# Patient Record
Sex: FEMALE | Race: WHITE
[De-identification: names, ages, dates, MRNs, and addresses within clinical notes are randomized per-mention and may not be internally consistent; named-entity substitution may affect disease eponyms.]

---

## 2017-01-26 ENCOUNTER — HOSPITAL ENCOUNTER (EMERGENCY)
Dept: HOSPITAL 36 - ER | Age: 47
Discharge: HOME | End: 2017-01-26
Payer: MEDICAID
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
## 2017-01-26 DIAGNOSIS — S62.636A: Primary | ICD-10-CM
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
## 2017-01-26 PROCEDURE — Z7502: HCPCS

## 2017-01-26 NOTE — ED PHYSICIAN CHART
Chief Complaint/HPI





- Patient Information


Date Seen:: 01/26/17


Time Seen:: 10:00


Chief Complaint:: trauma right small finger


History of Present Illness:: 





11 days ago getting up from supine position pushed herself up injuring right 

small finger.  Is right hand dominant.


Allergies:: 


 Allergies











Allergy/AdvReac Type Severity Reaction Status Date / Time


 


No Known Allergies Allergy   Verified 01/26/17 10:06











Vitals:: 


 Vital Signs - 8 hr











  01/26/17





  10:06


 


Temp 98.5 F


 


HR 78


 


RR 18


 


/74


 


O2 Sat % 97











Historian:: Patient


Review:: Nurse's Note Reviewed





Review of Systems





- Review of Systems


General/Constitutional: No fever


Skin: No skin lesions


Eyes: No loss of vision


ENT: No earache


Neck: No neck pain


Cardio Vascular: No chest pain


Pulmonary: No SOB


GI: No nausea, No vomiting


Musculoskeletal: Bone or joint pain


Psychiatric: No prior psych history, No depression


Hematopoietic: No bruising


Allergic/Immuno: No urticaria


Neurological: No syncope





Past Medical History





- Past Medical History


Past Medical History: No significant medical hx


Family History: Diabetes Melitus


Social History: Non Smoker


Surgical History: Hysterectomy


Psychiatricy History: None


Medication: None





Family Medical History





- Family Member


  ** Father


Ethnicity: 


Hx Family Diabetes: Yes





Physical Exam





- Physical Examination


General/Constitutional: Well-developed, well-nourished


Head: Atraumatic


Eyes: Lids, conjuctiva normal, PERRL


Skin: Well hydrated


ENMT: External ears, nose nl


Neck: No nuchal rigidity


Respiratory: Nl effort/Exclusion, Clear to Auscultation


Cardio Vascular: RRR, No murmur, gallop, rubs


GI: No tenderness/rebounding/guarding, No organomegaly, No hernia, Normal BS's


: No CVA tenderness


Other Extremities comments:: 





right small finger: about 15 degrees flexion of DIP joint; redness and swelling 

of distal segment; tenderness of DIP joint 





Labs/Radiology/EKG Results





- Radiology Results


Results: 


fracture base of distal phalanx right small finger





Assessment





- Assessment


General Assessment: 





redness of distal segment may be due to cellulitis; fracture or tendon rupture 

is unlikely to cause redness





ED Septic Shock





- .


Is Septic Shock (SBP<90, OR Lactate>4 mmol\L) present?: No





- <6hrs of presentation:


Vital Signs: 


 Vital Signs - 8 hr











  01/26/17





  10:06


 


Temp 98.5 F


 


HR 78


 


RR 18


 


/74


 


O2 Sat % 97














Reassessment (Disposition)





- Reassessment


Reassessment:: 


stak splint applied right small finger


Reassessment Condition:: Unchanged





- Diagnosis


Diagnosis:: 





tendon rupture DIP joint right small finger; fracture base of distal phalanx 

right small finger; cellulitis distal segment right small finger





- Aftercare/Follow up Instructions


Aftercare/Follow-Up Instructions:: Refer to Discharge Instructions


Medication Prescribed:: 


Keflex 500 mg #40 Sig 1 QID





- Patient Disposition


Discharge/Transfer:: Home


Condition at Disposition:: Stable, Unchanged

## 2017-01-26 NOTE — DIAGNOSTIC IMAGING REPORT
Right fifth finger 3 views



Indication: Trauma



Comparison: none



Findings: There is a mildly displaced fracture involving the dorsal base

of the fifth distal phalanx with surrounding soft tissue swelling. No

dislocation.



Impression:



Mildly displaced fracture involving the dorsal base of the fifth distal

phalanx with surrounding soft tissue swelling. Please correlate

clinically.



Results were administered to the referring team on 1/26/2017 at 11:30

AM.

## 2018-07-24 ENCOUNTER — HOSPITAL ENCOUNTER (INPATIENT)
Dept: HOSPITAL 36 - ER | Age: 48
LOS: 4 days | Discharge: HOME | DRG: 233 | End: 2018-07-28
Attending: FAMILY MEDICINE | Admitting: FAMILY MEDICINE
Payer: MEDICAID

## 2018-07-24 DIAGNOSIS — Z90.710: ICD-10-CM

## 2018-07-24 DIAGNOSIS — K76.0: ICD-10-CM

## 2018-07-24 DIAGNOSIS — Z82.49: ICD-10-CM

## 2018-07-24 DIAGNOSIS — K52.9: ICD-10-CM

## 2018-07-24 DIAGNOSIS — E78.5: ICD-10-CM

## 2018-07-24 DIAGNOSIS — E87.1: ICD-10-CM

## 2018-07-24 DIAGNOSIS — Z86.73: ICD-10-CM

## 2018-07-24 DIAGNOSIS — N20.0: ICD-10-CM

## 2018-07-24 DIAGNOSIS — K35.2: Primary | ICD-10-CM

## 2018-07-24 DIAGNOSIS — N39.0: ICD-10-CM

## 2018-07-24 DIAGNOSIS — E66.8: ICD-10-CM

## 2018-07-24 DIAGNOSIS — E86.0: ICD-10-CM

## 2018-07-24 DIAGNOSIS — E78.00: ICD-10-CM

## 2018-07-24 DIAGNOSIS — I10: ICD-10-CM

## 2018-07-24 DIAGNOSIS — K42.0: ICD-10-CM

## 2018-07-24 DIAGNOSIS — E87.6: ICD-10-CM

## 2018-07-24 LAB
ALBUMIN SERPL-MCNC: 4.2 GM/DL (ref 3.7–5.3)
ALBUMIN/GLOB SERPL: 1.4 {RATIO} (ref 1–1.8)
ALP SERPL-CCNC: 97 U/L (ref 34–104)
ALT SERPL-CCNC: 62 U/L (ref 7–52)
AMYLASE SERPL-CCNC: 13 U/L (ref 29–103)
ANION GAP SERPL CALC-SCNC: 14.1 MMOL/L (ref 7–16)
APPEARANCE UR: (no result)
AST SERPL-CCNC: 55 U/L (ref 13–39)
BACTERIA #/AREA URNS HPF: (no result) /HPF
BASOPHILS # BLD AUTO: 0.3 TH/CUMM (ref 0–0.2)
BASOPHILS NFR BLD AUTO: 3.4 % (ref 0–2)
BILIRUB SERPL-MCNC: 1.1 MG/DL (ref 0.3–1)
BILIRUB UR-MCNC: (no result) MG/DL
BUN SERPL-MCNC: 16 MG/DL (ref 7–25)
CALCIUM SERPL-MCNC: 9.3 MG/DL (ref 8.6–10.3)
CHLORIDE SERPL-SCNC: 100 MEQ/L (ref 98–107)
CHOLEST SERPL-MCNC: 256 MG/DL (ref ?–200)
CK SERPL-CCNC: 27 U/L (ref 30–223)
CO2 SERPL-SCNC: 21.1 MEQ/L (ref 21–31)
COLOR UR: YELLOW
CREAT SERPL-MCNC: 1 MG/DL (ref 0.6–1.2)
EOSINOPHIL # BLD AUTO: 0.1 TH/CMM (ref 0.1–0.4)
EOSINOPHIL NFR BLD AUTO: 0.8 % (ref 0–5)
EPI CELLS URNS QL MICRO: (no result) /LPF
ERYTHROCYTE [DISTWIDTH] IN BLOOD BY AUTOMATED COUNT: 13.1 % (ref 11.5–20)
FINE GRAN CASTS #/AREA URNS LPF: (no result) /LPF
GLOBULIN SER-MCNC: 3.1 GM/DL
GLUCOSE SERPL-MCNC: 137 MG/DL (ref 70–105)
GLUCOSE UR STRIP-MCNC: NEGATIVE MG/DL
GRAN CASTS #/AREA URNS: (no result) /LPF
HCT VFR BLD CALC: 42.9 % (ref 41–60)
HDLC SERPL-MCNC: 64 MG/DL (ref 23–92)
HGB BLD-MCNC: 14.6 GM/DL (ref 12–16)
INR PPP: 0.92 (ref 0.5–1.4)
KETONES UR STRIP-MCNC: (no result) MG/DL
LEUKOCYTE ESTERASE UR-ACNC: (no result)
LIPASE SERPL-CCNC: 11 U/L (ref 11–82)
LYMPHOCYTE AB SER FC-ACNC: 0.6 TH/CMM (ref 1.5–3)
LYMPHOCYTES NFR BLD AUTO: 7.8 % (ref 20–50)
MCH RBC QN AUTO: 31.1 PG (ref 27–31)
MCHC RBC AUTO-ENTMCNC: 34 PG (ref 28–36)
MCV RBC AUTO: 91.4 FL (ref 81–100)
MICRO URNS: YES
MONOCYTES # BLD AUTO: 0.1 TH/CMM (ref 0.3–1)
MONOCYTES NFR BLD AUTO: 0.8 % (ref 2–10)
NEUTROPHILS # BLD: 7.1 TH/CMM (ref 1.8–8)
NEUTROPHILS NFR BLD AUTO: 87.2 % (ref 40–80)
NITRITE UR QL STRIP: NEGATIVE
PH UR STRIP: 6 [PH] (ref 4.6–8)
PLATELET # BLD: 184 TH/CMM (ref 150–400)
PMV BLD AUTO: 8.8 FL
POTASSIUM SERPL-SCNC: 3.2 MEQ/L (ref 3.5–5.1)
PROT UR STRIP-MCNC: (no result) MG/DL
PROTHROMBIN TIME: 9.6 SECONDS (ref 9.5–11.5)
RBC # BLD AUTO: 4.7 MIL/CMM (ref 3.8–5.1)
RBC # UR STRIP: (no result) /UL
RBC #/AREA URNS HPF: (no result) /HPF (ref 0–5)
SODIUM SERPL-SCNC: 132 MEQ/L (ref 136–145)
SP GR UR STRIP: 1.01 (ref 1–1.03)
TRIGL SERPL-MCNC: 144 MG/DL (ref ?–150)
URINALYSIS COMPLETE PNL UR: (no result)
UROBILINOGEN UR STRIP-ACNC: 2 E.U./DL (ref 0.2–1)
WBC # BLD AUTO: 8.2 TH/CMM (ref 4.8–10.8)
WBC #/AREA URNS HPF: (no result) /HPF (ref 0–5)

## 2018-07-24 PROCEDURE — Z7610: HCPCS

## 2018-07-24 PROCEDURE — X6258: HCPCS

## 2018-07-24 PROCEDURE — 0DTJ4ZZ RESECTION OF APPENDIX, PERCUTANEOUS ENDOSCOPIC APPROACH: ICD-10-PCS

## 2018-07-24 RX ADMIN — DEXTROSE, SODIUM CHLORIDE, AND POTASSIUM CHLORIDE SCH MLS/HR: 5; .9; .15 INJECTION INTRAVENOUS at 17:48

## 2018-07-24 RX ADMIN — METRONIDAZOLE SCH MLS/HR: 500 INJECTION, SOLUTION INTRAVENOUS at 22:54

## 2018-07-24 NOTE — CONSULTATION
DATE OF CONSULTATION:  07/24/2018



SURGICAL CONSULTATION



REFERRING PHYSICIAN:  Dr. Pacheco.



REASON FOR CONSULTATION:  Abdominal pain.



Thank you for referring this patient to me.



HISTORY OF PRESENT ILLNESS:  This is a 47-year-old female with 2-day history of

abdominal pain, increasingly severe, associated nausea and vomiting.  She denied

diarrhea.



The patient smokes about 3-4 cigarettes a day.  She denies alcohol intake.



PAST MEDICAL HISTORY:  Includes vaginal hysterectomy and tubal ligation.



Otherwise, she does not take any medication.



LABORATORY STUDIES:  Show CBC to be normal.  The chemistry is abnormal with a

slight elevation in the liver function test and cholesterol is high at 256. 

Lipase low at 13.



A CT scan of the abdomen shows a small fat containing umbilical hernia.  There

is fatty infiltration of the liver.  There is a dilated partially air and fluid

filled appendix with trace fluid in the right lower quadrant.



PHYSICAL EXAMINATION:  There is severe tenderness in the right lower quadrant

with rebound.  Scar from previous surgeries noted.



IMPRESSION:

1.  Acute appendicitis.

2.  Fatty infiltration of the liver.

3.  Hypercholesterolemia.

4.  Moderate obesity.



PLAN:

1.  Laparoscopic versus open appendectomy.

2.  Repair of umbilical hernia.



Informed consent discussed with the patient and the son who was at bedside with

possible complications that might include among other things, bleeding,

infection, last unintended injury to internal organs, open appendectomy, and

those related to anesthesia.  The patient and son understands and will proceed

with a laparoscopic versus open appendectomy and hernia repair.





DD: 07/24/2018 18:19

DT: 07/24/2018 20:10

[facility]# [id number]  [id number]

## 2018-07-24 NOTE — OPERATIVE REPORT
DATE OF SURGERY:  07/24/2018



PREOPERATIVE DIAGNOSES:

1.  Acute appendicitis.

2.  Incarcerated umbilical hernia (omentum).

3.  Post-vaginal hysterectomy.



POSTOPERATIVE DIAGNOSES:

1.  Perforated appendicitis.

2.  Incarcerated umbilical hernia (omentum).

3.  Post-vaginal hysterectomy.



OPERATION DONE:

1.  Laparoscopic appendectomy.

2.  Primary repair of incarcerated umbilical hernia.



SURGEON:  Dr. Bel José.



ANESTHESIA:  General.



ANESTHESIOLOGIST:  Dr. Saenz.



ESTIMATED BLOOD LOSS:  5 mL.



OPERATIVE FINDINGS:  Perforated appendix with gangrenous mid portion of the body

of the appendix, which was markedly swollen.



DRAIN:  A Tre-Hicks drain was left in place.



DESCRIPTION OF PROCEDURE:  The patient was given general anesthesia.  The

abdomen was prepped with ChloraPrep and draped in appropriate manner.



An infraumbilical incision was made along the skin line.  Bleeders were

coagulated.



The incision was carried all the way down to the peritoneum and the finger was

introduced.  There was incarcerated omentum and the umbilicus, which was freed

back into the abdominal cavity.



Old Nurolon was then sutured to the fascia as a 10-mm trocar was introduced. 

CO2 was insufflated.  Scope was introduced.  There was good visualization of the

intra-abdominal cavity.



A 5 mm was placed in the lower abdomen at midline.  A 12-mm trocar was placed in

the left flank.



The operating table was lowered at the head and turned to the left side.  The

appendix was found to be necrotic at the mid portion.  In the process of

manipulation, this exuded purulent material and culture was taken.



The base of the appendix was easily identified and a clip was applied.  The rest

of the mesoappendix serially clamped with [place] and transected.



Following satisfactory hemostasis, some clips were applied.



Irrigation with saline solution and aspiration of fluid was then done.  A

Tre-Hicks drain was left in the area of the appendix.  The appendix removed

in Endobag.



Following satisfactory hemostasis confirmation of the bleeding site, trocar

sites were infiltrated with 0.5% Marcaine with epinephrine.  The skin incision

closed with staples.  The patient tolerated the procedure well.





DD: 07/24/2018 20:41

DT: 07/24/2018 21:25

JOB# [id number]  [id number]

## 2018-07-24 NOTE — GENERAL PROGRESS NOTE
Subjective





- Review of Systems


Service Date: 07/24/18


Events since last encounter: 





CONSULT DICTATED


CT DILATED APPENDIX, FATTY LIVER


PE TENDER RLQ WITH REBOUND


PLAN; LAP VS OPEN APPENDECTOMY WITH HERNIA REPAIR





Objective





- Results


Result Diagrams: 


 07/24/18 11:00





 07/24/18 11:00


Recent Labs: 


 Laboratory Last Values











WBC  8.2 Th/cmm (4.8-10.8)   07/24/18  11:00    


 


RBC  4.70 Mil/cmm (3.80-5.10)   07/24/18  11:00    


 


Hgb  14.6 gm/dL (12-16)   07/24/18  11:00    


 


Hct  42.9 % (41.0-60)   07/24/18  11:00    


 


MCV  91.4 fl ()   07/24/18  11:00    


 


MCH  31.1 pg (27.0-31.0)  H  07/24/18  11:00    


 


MCHC Differential  34.0 pg (28.0-36.0)   07/24/18  11:00    


 


RDW  13.1 % (11.5-20.0)   07/24/18  11:00    


 


Plt Count  184 Th/cmm (150-400)   07/24/18  11:00    


 


MPV  8.8 fl  07/24/18  11:00    


 


Neutrophils %  87.2 % (40.0-80.0)  H  07/24/18  11:00    


 


Lymphocytes %  7.8 % (20.0-50.0)  L  07/24/18  11:00    


 


Monocytes %  0.8 % (2.0-10.0)  L  07/24/18  11:00    


 


Eosinophils %  0.8 % (0.0-5.0)   07/24/18  11:00    


 


Basophils %  3.4 % (0.0-2.0)  H  07/24/18  11:00    


 


Neutrophils (Manual)  Not Reportable   07/24/18  11:00    


 


PT  9.6 SECONDS (9.5-11.5)   07/24/18  11:00    


 


INR  0.92  (0.5-1.4)   07/24/18  11:00    


 


Sodium  132 mEq/L (136-145)  L  07/24/18  11:00    


 


Potassium  3.2 mEq/L (3.5-5.1)  L  07/24/18  11:00    


 


Chloride  100 mEq/L ()   07/24/18  11:00    


 


Carbon Dioxide  21.1 mEq/L (21.0-31.0)   07/24/18  11:00    


 


Anion Gap  14.1  (7.0-16.0)   07/24/18  11:00    


 


BUN  16 mg/dL (7-25)   07/24/18  11:00    


 


Creatinine  1.0 mg/dL (0.6-1.2)   07/24/18  11:00    


 


Est GFR ( Amer)  > 60.0 ml/min (>90)   07/24/18  11:00    


 


Est GFR (Non-Af Amer)  > 60.0 ml/min  07/24/18  11:00    


 


BUN/Creatinine Ratio  16.0   07/24/18  11:00    


 


Glucose  137 mg/dL ()  H  07/24/18  11:00    


 


Calcium  9.3 mg/dL (8.6-10.3)   07/24/18  11:00    


 


Total Bilirubin  1.1 mg/dL (0.3-1.0)  H  07/24/18  11:00    


 


AST  55 U/L (13-39)  H  07/24/18  11:00    


 


ALT  62 U/L (7-52)  H  07/24/18  11:00    


 


Alkaline Phosphatase  97 U/L ()   07/24/18  11:00    


 


Creatine Kinase  27 U/L ()  L  07/24/18  11:00    


 


Troponin I  < 0.01 ng/mL (0.01-0.05)  L  07/24/18  11:00    


 


B-Natriuretic Peptide  10.4 pg/mL (5.0-100.0)   07/24/18  11:00    


 


Total Protein  7.3 gm/dL (6.0-8.3)   07/24/18  11:00    


 


Albumin  4.2 gm/dL (3.7-5.3)   07/24/18  11:00    


 


Globulin  3.1 gm/dL  07/24/18  11:00    


 


Albumin/Globulin Ratio  1.4  (1.0-1.8)   07/24/18  11:00    


 


Triglycerides  144 mg/dL (<150)   07/24/18  11:00    


 


Cholesterol  256 mg/dL (<200)  H  07/24/18  11:00    


 


LDL Cholesterol Direct  173 mg/dL ()   07/24/18  11:00    


 


HDL Cholesterol  64 mg/dL (23-92)   07/24/18  11:00    


 


Amylase  13 U/L ()  L  07/24/18  11:00    


 


Lipase  11 U/L (11-82)   07/24/18  11:00    


 


Serum Pregnancy, Qual  NEGATIVE  (NEGATIVE)   07/24/18  11:00    


 


Urine Source  CLEAN C   07/24/18  13:29    


 


Urine Color  YELLOW   07/24/18  13:29    


 


Urine Clarity  HAZY  (CLEAR)   07/24/18  13:29    


 


Urine pH  6.0  (4.6 - 8.0)   07/24/18  13:29    


 


Ur Specific Gravity  1.010  (1.005-1.030)   07/24/18  13:29    


 


Urine Protein  TRACE mg/dL (NEGATIVE)   07/24/18  13:29    


 


Urine Glucose (UA)  NEGATIVE mg/dL (NEGATIVE)   07/24/18  13:29    


 


Urine Ketones  TRACE mg/dL (NEGATIVE)   07/24/18  13:29    


 


Urine Blood  TRACE  (NEGATIVE)   07/24/18  13:29    


 


Urine Nitrate  NEGATIVE  (NEGATIVE)   07/24/18  13:29    


 


Urine Bilirubin  SMALL  (NEGATIVE)  H  07/24/18  13:29    


 


Urine Urobilinogen  2.0 E.U./dL (0.2 - 1.0)   07/24/18  13:29    


 


Ur Leukocyte Esterase  TRACE  (NEGATIVE)  H  07/24/18  13:29    


 


Urine RBC  2-5 /hpf (0-5)   07/24/18  13:29    


 


Urine WBC  6-10 /hpf (0-5)  H  07/24/18  13:29    


 


Ur Epithelial Cells  MODERATE /lpf (FEW)   07/24/18  13:29    


 


Urine Bacteria  1+ /hpf (NONE SEEN)  H  07/24/18  13:29    


 


Fine Granular Casts  0-2 /lpf (NONE SEEN)  H  07/24/18  13:29    


 


Coarse Granular Casts  2-5 /lpf (NONE SEEN)  H  07/24/18  13:29    














- Physical Exam


Vitals and I&O: 


 Vital Signs











Temp  99.5 F   07/24/18 17:00


 


Pulse  93   07/24/18 17:00


 


Resp  20   07/24/18 17:16


 


BP  108/69   07/24/18 17:00


 


Pulse Ox  95   07/24/18 17:00








 Intake & Output











 07/23/18 07/24/18 07/24/18





 18:59 06:59 18:59


 


Weight (lbs)   76.657 kg


 


Other:   


 


  # Voids   1


 


  Stool Characteristics   Formed


 


  Weight Source   Patient stated











Active Medications: 


Current Medications





Potassium Chloride/Dextrose/Sod Cl (D5-0.9ns W/Kcl 20meq)  1,000 mls @ 125 mls/

hr IV .Q8H AISSATOU


   Stop: 09/22/18 15:51


   Last Admin: 07/24/18 17:48 Dose:  125 mls/hr


Piperacillin Sod/Tazobactam (Sod 3.375 gm/ Sodium Chloride)  50 mls @ 100 mls/

hr IV Q6HR AISSATOU


   Stop: 09/22/18 17:59


   Last Admin: 07/24/18 17:49 Dose:  100 mls/hr


Morphine Sulfate (Morphine)  4 mg IV Q4H PRN


   PRN Reason: Pain (Severe)


   Stop: 09/22/18 15:59

## 2018-07-24 NOTE — ED PHYSICIAN CHART
ED Chief Complaint/HPI





- Patient Information


Date Seen:: 07/24/18


Time Seen:: 10:30


Chief Complaint:: Abdominal Pain


History of Present Illness:: 





onset x 12 hours of intermittent, sharp, crampy epigastric, arnold-umbilical, and 

lower abdominal pain with A/N/V/D x 3; pt denies trauma, H/As, S/T, neck pain,. 

cough, C/P, SOB, VB, VD, fever, chills, or urinary s/s


Allergies:: 


 Allergies











Allergy/AdvReac Type Severity Reaction Status Date / Time


 


No Known Allergies Allergy   Verified 01/26/17 10:06











Vitals:: 


 Vital Signs - 8 hr











  07/24/18





  10:29


 


Temp 96.9 F


 


RR 96


 


/48


 


O2 Sat % 96











Historian:: Patient


Review:: Nurse's Note Reviewed





ED Review of Systems





- Review of Systems


General/Constitutional: Fever, No chills, No weight loss, No weakness, No 

diaphoresis, No edema, No loss of appetite


Skin: No skin lesions, No rash, No bruising


Head: No headache, No light-headedness


Eyes: No loss of vision, No pain, No diplopia


ENT: No earache, No nasal drainage, No sore throat, No tinnitus


Neck: No neck pain, No swelling, No thyromegaly, No stiffness, No mass noted


Cardio Vascular: No chest pain, No palpitations, No PND, No orthopnea, No edema


Pulmonary: No SOB, No cough, No sputum, No wheezing


GI: Nausea, Vomiting, Diarrhea, Pain, No melena, No hematochezia, No 

constipation, No hematemesis


G/U: No dysuria, No frequency, No hematuria, No nacturia


Ob/Gyn: No vaginal discharge, No abnormal vaginal bleed, No contraction


Musculoskeletal: No bone or joint pain, No back pain, No muscle pain


Endocrine: No polyuria, No polydipsia


Psychiatric: No prior psych history, No depression, No anxiety, No suicidal 

ideation, No homicidal ideation, No auditory hallucination, No visual 

hallucination


Hematopoietic: No bruising, No lymphadenopathy


Allergic/Immuno: No urticaria, No angioedema


Neurological: No syncope, No focal symptoms, No weakness, No paresthesia, No 

headache, No seizure, No dizziness, No confusion, No vertigo





ED Past Medical History





- Past Medical History


Obtainable: Yes


Past Medical History: HTN, CVA/TIA


Family History: HTN


Social History: Non Smoker, No Alcohol, No Drug Use, 


Surgical History: Hysterectomy


Psychiatricy History: None


Medication: Reviewed





Family Medical History





- Family Member


  ** Father


Ethnicity: 


Hx Family Diabetes: Yes





ED Physical Exam





- Physical Examination


General/Constitutional: Awake, Well-developed, well-nourished, Alert, No 

distress, GCS 15, Non-toxic appearing, Ambulatory


Head: Atraumatic


Eyes: Lids, conjuctiva normal, PERRL, EOMI


Skin: Nl inspection, No rash, No skin lesions, No ecchymosis, Well hydrated, No 

lymphadenopathy


ENMT: External ears, nose nl, TM canals nl, Nasal exam nl, Lips, teeth, gums nl

, Oropharynx nl, Tonsils nl


Neck: Nontender, Full ROM w/o pain, No JVD, No nuchal rigidity, No bruit, No 

mass, No stridor


Other Neck comments:: 





supple; no meningeal signs; no cervical tenderness


Respiratory: Nl effort/Exclusion, Clear to Auscultation, No Wheeze/Rhonchi/Rales


Cardio Vascular: RRR, No murmur, gallop, rubs, NL S1 S2, Carotid/Femoral/Distal 

pulses equal bilaterally


GI: No tenderness/rebounding/guarding, No organomegaly, No hernia, Normal BS's, 

Nondistended, No mass/bruits, No McBurney tenderness, Rectum exam nl


Other GI comments:: 





+ RLQ Tenderness; no pulsatile masses


: No CVA tenderness


Extremities: No tenderness or effusion, Full ROM, normal strength in all 

extremities, No edema, Normal digits & nails


Neuro/Psych: Alert/oriented, DTR's symmetric, Normal sensory exam, Normal motor 

strength, Judgement/insight normal, Mood normal, Normal gait, No focal deficits


Misc: Normal back, No paraspinal tenderness





ED Labs/Radiology/EKG Results





- Lab Results


Comments:: 





Na+: 132; K+: 3.2; U/A: + Pyuria





- Radiology Results


Comments:: 





Possible Appendicitis





- EKG Interpretations


EKG Time:: 11:00


Rate & Rhythm: 88; NSR


Comments:: 





non-specific st-t changes





ED Septic Shock





- .


Is Septic Shock (SBP<90, OR Lactate>4 mmol\L) present?: No





- <6hrs of presentation:


Vital Signs: 


 Vital Signs - 8 hr











  07/24/18





  10:29


 


Temp 96.9 F


 


RR 96


 


/48


 


O2 Sat % 96














ED Reassessment (Disposition)





- Reassessment


Reassessment Condition:: Improved





- Diagnosis


Diagnosis:: 





Abdominal Pain; N/V/D; Appendicitis; UTI; AGE; Gastritis; IBD; Umbilical Hernia

; Nephrolithiasis; Hyponatremia; Dehydration; Hypokalemia





- Aftercare/Follow up Instructions


Aftercare/Follow-Up Instructions:: Counseled pt regarding lab results/diagnosis 

& need follow up, Counseled pt & family regarding lab results/diagnosis & need 

follow up





- Patient Disposition


Discharge/Transfer:: Acute Care w/in this hosp


Accepting Physician:: Dr. Pacheco


Time Called:: 1215


Time Responded:: 12:15


Admitted to:: Telemetry


Spoke to:: Dr. Pacheco


Admitting Medical Physician:: Dr. Pacheco


Condition at Disposition:: Stable, Improved

## 2018-07-24 NOTE — DIAGNOSTIC IMAGING REPORT
CT abdomen and pelvis without intravenous contrast



Indication: Abdominal pain and vomiting



Comparison: None,



Technique: Axial images were obtained from the lung bases to the

bilateral proximal femurs without IV contrast.  Coronal reconstructions

were made.  total DLP: 580,  CTDI11.5



FINDINGS:



Hypoventilatory atelectatic changes of the lung bases are noted.  3 mm

right lower lobe granuloma is noted.  Assessment of the solid organs is

limited due to lack of IV contrast.  There is fatty infiltration of the

liver.  No focal hepatic lesions.  Punctate granuloma seen within the

spleen.  No focal pancreatic or adrenal lesions.  



Nonspecific bilateral perinephric inflammatory changes are noted. 

Slightly lobulated left renal borders are noted.  No discrete mass is

identified.  No hydronephrosis.  The patient is status post

hysterectomy.  



There is diffuse fatty infiltration of the colonic mucosa.  There are

diffuse inflammatory changes seen within the right lower quadrant

including surrounding the dilated partially air and fluid-filled

appendix.  Trace free fluid in the right lower quadrant is also noted

with phlegmonous changes also seen in this region.  Areas of bowel wall

thickening also noted in this region including the terminal ileum.  No

free air identified.  



A small fat-containing umbilical hernia is noted.  Mild atherosclerosis

is noted.  Degenerative changes of the spine are noted.



IMPRESSION:



Inflammatory changes within the right lower quadrant including mild

phlegmonous changes changes and small amount of free fluid.  Areas of

bowel wall thickening are also noted including the terminal ileum. 

There is dilated and partially air-filled appendix.  Differential

diagnosis includes etiologies such as inflammatory bowel disease. 

Appendicitis would be considered less likely but cannot be completely

excluded.  Clinical correlation and follow-up is recommended.



Fatty infiltration of the colonic mucosa which may be due to underlying

inflammatory bowel disease.  



Nonspecific bilateral perinephric inflammatory changes.  



Slight lobulation of the left renal margins without evidence of discrete

mass.  Ultrasound or CT with IV contrast would further clarify if

indicated.



Fatty liver.



Small fat-containing umbilical hernia.



Evidence of prior hysterectomy.



Final results are listed to the ER on 7/24/2018 at 2:10 PM.

## 2018-07-25 LAB
ALBUMIN SERPL-MCNC: 3.4 GM/DL (ref 3.7–5.3)
ALBUMIN/GLOB SERPL: 1.2 {RATIO} (ref 1–1.8)
ALP SERPL-CCNC: 70 U/L (ref 34–104)
ALT SERPL-CCNC: 39 U/L (ref 7–52)
ANION GAP SERPL CALC-SCNC: 12.3 MMOL/L (ref 7–16)
AST SERPL-CCNC: 26 U/L (ref 13–39)
BASOPHILS # BLD AUTO: 0 TH/CUMM (ref 0–0.2)
BASOPHILS # BLD AUTO: 0.1 TH/CUMM (ref 0–0.2)
BASOPHILS NFR BLD AUTO: 0.1 % (ref 0–2)
BASOPHILS NFR BLD AUTO: 1.1 % (ref 0–2)
BILIRUB SERPL-MCNC: 0.9 MG/DL (ref 0.3–1)
BUN SERPL-MCNC: 18 MG/DL (ref 7–25)
CALCIUM SERPL-MCNC: 8.5 MG/DL (ref 8.6–10.3)
CHLORIDE SERPL-SCNC: 108 MEQ/L (ref 98–107)
CO2 SERPL-SCNC: 21.5 MEQ/L (ref 21–31)
CREAT SERPL-MCNC: 0.9 MG/DL (ref 0.6–1.2)
EOSINOPHIL # BLD AUTO: 0 TH/CMM (ref 0.1–0.4)
EOSINOPHIL # BLD AUTO: 0.2 TH/CMM (ref 0.1–0.4)
EOSINOPHIL NFR BLD AUTO: 0.5 % (ref 0–5)
EOSINOPHIL NFR BLD AUTO: 2.3 % (ref 0–5)
ERYTHROCYTE [DISTWIDTH] IN BLOOD BY AUTOMATED COUNT: 13.1 % (ref 11.5–20)
ERYTHROCYTE [DISTWIDTH] IN BLOOD BY AUTOMATED COUNT: 13.3 % (ref 11.5–20)
GLOBULIN SER-MCNC: 2.8 GM/DL
GLUCOSE SERPL-MCNC: 123 MG/DL (ref 70–105)
HCT VFR BLD CALC: 36.2 % (ref 41–60)
HCT VFR BLD CALC: 36.5 % (ref 41–60)
HGB BLD-MCNC: 12.5 GM/DL (ref 12–16)
HGB BLD-MCNC: 12.6 GM/DL (ref 12–16)
LYMPHOCYTE AB SER FC-ACNC: 1 TH/CMM (ref 1.5–3)
LYMPHOCYTE AB SER FC-ACNC: 1 TH/CMM (ref 1.5–3)
LYMPHOCYTES NFR BLD AUTO: 11.8 % (ref 20–50)
LYMPHOCYTES NFR BLD AUTO: 12.6 % (ref 20–50)
MCH RBC QN AUTO: 31.3 PG (ref 27–31)
MCH RBC QN AUTO: 31.6 PG (ref 27–31)
MCHC RBC AUTO-ENTMCNC: 34.5 PG (ref 28–36)
MCHC RBC AUTO-ENTMCNC: 34.5 PG (ref 28–36)
MCV RBC AUTO: 90.8 FL (ref 81–100)
MCV RBC AUTO: 91.5 FL (ref 81–100)
MONOCYTES # BLD AUTO: 0.4 TH/CMM (ref 0.3–1)
MONOCYTES # BLD AUTO: 0.5 TH/CMM (ref 0.3–1)
MONOCYTES NFR BLD AUTO: 4.5 % (ref 2–10)
MONOCYTES NFR BLD AUTO: 5.6 % (ref 2–10)
NEUTROPHILS # BLD: 6.3 TH/CMM (ref 1.8–8)
NEUTROPHILS # BLD: 6.9 TH/CMM (ref 1.8–8)
NEUTROPHILS NFR BLD AUTO: 79.2 % (ref 40–80)
NEUTROPHILS NFR BLD AUTO: 82.3 % (ref 40–80)
PLATELET # BLD: 155 TH/CMM (ref 150–400)
PLATELET # BLD: 168 TH/CMM (ref 150–400)
PMV BLD AUTO: 8.9 FL
PMV BLD AUTO: 8.9 FL
POTASSIUM SERPL-SCNC: 3.8 MEQ/L (ref 3.5–5.1)
RBC # BLD AUTO: 3.96 MIL/CMM (ref 3.8–5.1)
RBC # BLD AUTO: 4.02 MIL/CMM (ref 3.8–5.1)
SODIUM SERPL-SCNC: 138 MEQ/L (ref 136–145)
WBC # BLD AUTO: 8.1 TH/CMM (ref 4.8–10.8)
WBC # BLD AUTO: 8.3 TH/CMM (ref 4.8–10.8)

## 2018-07-25 RX ADMIN — METRONIDAZOLE SCH MLS/HR: 500 INJECTION, SOLUTION INTRAVENOUS at 20:16

## 2018-07-25 RX ADMIN — METRONIDAZOLE SCH MLS/HR: 500 INJECTION, SOLUTION INTRAVENOUS at 06:49

## 2018-07-25 RX ADMIN — DEXTROSE, SODIUM CHLORIDE, AND POTASSIUM CHLORIDE SCH MLS/HR: 5; .9; .15 INJECTION INTRAVENOUS at 23:38

## 2018-07-25 RX ADMIN — METRONIDAZOLE SCH MLS/HR: 500 INJECTION, SOLUTION INTRAVENOUS at 13:48

## 2018-07-25 RX ADMIN — MORPHINE SULFATE PRN MG: 4 INJECTION, SOLUTION INTRAMUSCULAR; INTRAVENOUS at 19:43

## 2018-07-25 RX ADMIN — MORPHINE SULFATE PRN MG: 4 INJECTION, SOLUTION INTRAMUSCULAR; INTRAVENOUS at 23:37

## 2018-07-25 RX ADMIN — MORPHINE SULFATE PRN MG: 4 INJECTION, SOLUTION INTRAMUSCULAR; INTRAVENOUS at 14:44

## 2018-07-25 RX ADMIN — MORPHINE SULFATE PRN MG: 4 INJECTION, SOLUTION INTRAMUSCULAR; INTRAVENOUS at 06:05

## 2018-07-25 RX ADMIN — DEXTROSE, SODIUM CHLORIDE, AND POTASSIUM CHLORIDE SCH MLS/HR: 5; .9; .15 INJECTION INTRAVENOUS at 09:53

## 2018-07-25 RX ADMIN — MORPHINE SULFATE PRN MG: 4 INJECTION, SOLUTION INTRAMUSCULAR; INTRAVENOUS at 10:08

## 2018-07-25 NOTE — INTERNAL MEDICINE PROG NOTE
Internal Medicine Subjective





- Subjective


Service Date: 07/25/18


Patient seen and examined:: without staff (SHE STILL HAS PAIN)


Patient is:: in bed, talking


Patient Complaints of:: other (SHE HAS PAIN AT SITE OF SURGERY)


Per staff patient has:: no adverse event





Internal Medicine Objective





- Results


Result Diagrams: 


 07/25/18 14:56





 07/25/18 05:30


Recent Labs: 


 Laboratory Last Values











WBC  8.1 Th/cmm (4.8-10.8)   07/25/18  14:56    


 


RBC  3.96 Mil/cmm (3.80-5.10)   07/25/18  14:56    


 


Hgb  12.5 gm/dL (12-16)   07/25/18  14:56    


 


Hct  36.2 % (41.0-60)  L  07/25/18  14:56    


 


MCV  91.5 fl ()   07/25/18  14:56    


 


MCH  31.6 pg (27.0-31.0)  H  07/25/18  14:56    


 


MCHC Differential  34.5 pg (28.0-36.0)   07/25/18  14:56    


 


RDW  13.3 % (11.5-20.0)   07/25/18  14:56    


 


Plt Count  168 Th/cmm (150-400)   07/25/18  14:56    


 


MPV  8.9 fl  07/25/18  14:56    


 


Neutrophils %  79.2 % (40.0-80.0)   07/25/18  14:56    


 


Lymphocytes %  11.8 % (20.0-50.0)  L  07/25/18  14:56    


 


Monocytes %  5.6 % (2.0-10.0)   07/25/18  14:56    


 


Eosinophils %  2.3 % (0.0-5.0)   07/25/18  14:56    


 


Basophils %  1.1 % (0.0-2.0)   07/25/18  14:56    


 


Neutrophils (Manual)  Not Reportable   07/24/18  11:00    


 


PT  9.6 SECONDS (9.5-11.5)   07/24/18  11:00    


 


INR  0.92  (0.5-1.4)   07/24/18  11:00    


 


Sodium  138 mEq/L (136-145)   07/25/18  05:30    


 


Potassium  3.8 mEq/L (3.5-5.1)   07/25/18  05:30    


 


Chloride  108 mEq/L ()  H  07/25/18  05:30    


 


Carbon Dioxide  21.5 mEq/L (21.0-31.0)   07/25/18  05:30    


 


Anion Gap  12.3  (7.0-16.0)   07/25/18  05:30    


 


BUN  18 mg/dL (7-25)   07/25/18  05:30    


 


Creatinine  0.9 mg/dL (0.6-1.2)   07/25/18  05:30    


 


Est GFR ( Amer)  > 60.0 ml/min (>90)   07/25/18  05:30    


 


Est GFR (Non-Af Amer)  > 60.0 ml/min  07/25/18  05:30    


 


BUN/Creatinine Ratio  20.0   07/25/18  05:30    


 


Glucose  123 mg/dL ()  H  07/25/18  05:30    


 


POC Glucose  128 MG/DL (70 - 105)  H  07/24/18  18:16    


 


Calcium  8.5 mg/dL (8.6-10.3)  L  07/25/18  05:30    


 


Total Bilirubin  0.9 mg/dL (0.3-1.0)   07/25/18  05:30    


 


AST  26 U/L (13-39)   07/25/18  05:30    


 


ALT  39 U/L (7-52)   07/25/18  05:30    


 


Alkaline Phosphatase  70 U/L ()   07/25/18  05:30    


 


Creatine Kinase  27 U/L ()  L  07/24/18  11:00    


 


Troponin I  < 0.01 ng/mL (0.01-0.05)  L  07/24/18  11:00    


 


B-Natriuretic Peptide  10.4 pg/mL (5.0-100.0)   07/24/18  11:00    


 


Total Protein  6.2 gm/dL (6.0-8.3)   07/25/18  05:30    


 


Albumin  3.4 gm/dL (3.7-5.3)  L  07/25/18  05:30    


 


Globulin  2.8 gm/dL  07/25/18  05:30    


 


Albumin/Globulin Ratio  1.2  (1.0-1.8)   07/25/18  05:30    


 


Triglycerides  144 mg/dL (<150)   07/24/18  11:00    


 


Cholesterol  256 mg/dL (<200)  H  07/24/18  11:00    


 


LDL Cholesterol Direct  173 mg/dL ()   07/24/18  11:00    


 


HDL Cholesterol  64 mg/dL (23-92)   07/24/18  11:00    


 


Amylase  13 U/L ()  L  07/24/18  11:00    


 


Lipase  11 U/L (11-82)   07/24/18  11:00    


 


Serum Pregnancy, Qual  NEGATIVE  (NEGATIVE)   07/24/18  11:00    


 


Urine Source  CLEAN C   07/24/18  13:29    


 


Urine Color  YELLOW   07/24/18  13:29    


 


Urine Clarity  HAZY  (CLEAR)   07/24/18  13:29    


 


Urine pH  6.0  (4.6 - 8.0)   07/24/18  13:29    


 


Ur Specific Gravity  1.010  (1.005-1.030)   07/24/18  13:29    


 


Urine Protein  TRACE mg/dL (NEGATIVE)   07/24/18  13:29    


 


Urine Glucose (UA)  NEGATIVE mg/dL (NEGATIVE)   07/24/18  13:29    


 


Urine Ketones  TRACE mg/dL (NEGATIVE)   07/24/18  13:29    


 


Urine Blood  TRACE  (NEGATIVE)   07/24/18  13:29    


 


Urine Nitrate  NEGATIVE  (NEGATIVE)   07/24/18  13:29    


 


Urine Bilirubin  SMALL  (NEGATIVE)  H  07/24/18  13:29    


 


Urine Urobilinogen  2.0 E.U./dL (0.2 - 1.0)   07/24/18  13:29    


 


Ur Leukocyte Esterase  TRACE  (NEGATIVE)  H  07/24/18  13:29    


 


Urine RBC  2-5 /hpf (0-5)   07/24/18  13:29    


 


Urine WBC  6-10 /hpf (0-5)  H  07/24/18  13:29    


 


Ur Epithelial Cells  MODERATE /lpf (FEW)   07/24/18  13:29    


 


Urine Bacteria  1+ /hpf (NONE SEEN)  H  07/24/18  13:29    


 


Fine Granular Casts  0-2 /lpf (NONE SEEN)  H  07/24/18  13:29    


 


Coarse Granular Casts  2-5 /lpf (NONE SEEN)  H  07/24/18  13:29    














- Physical Exam


Vitals and I&O: 


 Vital Signs











Temp  99.5 F   07/25/18 20:00


 


Pulse  95   07/25/18 20:00


 


Resp  18   07/25/18 20:00


 


BP  113/59   07/25/18 20:00


 


Pulse Ox  91   07/25/18 20:00








 Intake & Output











 07/25/18 07/25/18 07/26/18





 06:59 18:59 06:59


 


Intake Total 1200 250 


 


Output Total 50  


 


Balance 1150 250 


 


Weight (lbs) 84.822 kg  


 


Intake:   


 


  Intake, IV Amount 1200 250 


 


    D5-0.9NS w/KCL 20mEq 1, 1000  





    000 ml @ 125 mls/hr IV .   





    Q8H [facility] Rx#:[id number]   


 


    Piperacillin Sodium/ 100 50 





    Tazobact 3.375 gm In   





    Sodium Chloride 0.9% 50   





    ml @ 100 mls/hr IV Q6HR   





    [facility] Rx#:[id number]   


 


    metroNIDAZOLE 500mg/ 200 





    100mL 500 mg In 100 ml @   





    100 mls/hr IV Q8HR [facility] Rx   





    #:[id number]   


 


Output:   


 


  Drainage 50  


 


    Lower Medial Abdomen 50  


 


Other:   


 


  # Voids 1  


 


  Weight Source Bedscale  











Active Medications: 


Current Medications





Potassium Chloride/Dextrose/Sod Cl (D5-0.9ns W/Kcl 20meq)  1,000 mls @ 125 mls/

hr IV .Q8H [facility]


   Stop: 09/22/18 15:51


   Last Admin: 07/25/18 09:53 Dose:  125 mls/hr


Piperacillin Sod/Tazobactam (Sod 3.375 gm/ Sodium Chloride)  50 mls @ 100 mls/

hr IV Q6HR [facility]


   Stop: 09/22/18 17:59


   Last Admin: 07/25/18 17:32 Dose:  100 mls/hr


Metronidazole (Flagyl)  500 mg in 100 mls @ 100 mls/hr IV Q8HR [facility]


   Stop: 09/22/18 20:59


   Last Admin: 07/25/18 20:16 Dose:  100 mls/hr


Morphine Sulfate (Morphine)  4 mg IV Q4H PRN


   PRN Reason: Pain (Severe)


   Stop: 09/22/18 15:59


   Last Admin: 07/25/18 19:43 Dose:  4 mg








General: alert


HEENT: NC/AT, PERRLA, EOMI, anicteric sclerae, throat clear


Neck: Supple, No JVD, No thyromegaly, +2 carotid pulse wo bruit, No LAD


Lungs: CTAB


Cardiovascular: RRR, Normal S1, Normal S2


Abdomen: tender


Neurological: no change





Internal Medicine Assmt/Plan





- Assessment


Assessment: 





ACUTE APPENDICITIS AND SP LAP.APPENDECTOMY.





- Plan


Plan: 





CONTINUE ON CURRENT MEDICATION AND DIET.

## 2018-07-25 NOTE — HISTORY & PHYSICAL
ADMIT DATE:  07/24/2018



CHIEF COMPLAINT:  Right lower abdominal pain for a few days' duration.



HISTORY OF PRESENT ILLNESS:  The patient is a 47-year-old female presented to

the Emergency Room with severe pain of the right lower abdomen, evaluated by the

ER physician.  Initial workup significant for possible acute appendicitis,

admitted to the hospital, started on IV fluid, n.p.o.  Dr. José consulted on the

case.  No nausea, no vomiting, no fever, no chills.



PAST MEDICAL HISTORY:  Not significant.



PAST SURGICAL HISTORY:  Not significant.



ALLERGIES:  None.



MEDICATIONS:  Follow admission reconciliation.



SOCIAL HISTORY:  No smoking, alcohol, or drug use.



FAMILY HISTORY:  Noncontributory.



REVIEW OF SYSTEMS:

IMMUNOSYSTEM:  No history of chronic renal disorder.

CARDIOVASCULAR SYSTEM:  No coronary artery disease.

ENDOCRINE SYSTEM:  No diabetes or thyroid problem.

GASTROINTESTINAL SYSTEM:  No upper or lower gastrointestinal bleed.

NEUROLOGICAL SYSTEM:  No seizure disorder.

MUSCULOSKELETAL SYSTEM:  No muscular atrophy.

HEMATOLOGIC SYSTEM:  No bleeding tendencies.

RESPIRATORY SYSTEM:  No asthma.

GENITOURINARY:  No dysuria or hematuria.



PHYSICAL EXAMINATION:

GENERAL:  He is awake, alert, oriented, mildly in pain, not in distress.

VITAL SIGNS:  Temperature 98.2, heart rate 82, and blood pressure 122/69.

HEENT:  Normocephalic.  Pupils reactive to light and accommodation.  Sclerae

clear.

NECK:  Supple.  Negative for lymphadenopathy, JVD, or bruit.

CHEST:  Entry of air bilateral normal.  No rhonchi or wheezing.

HEART:  S1, S2 normal.  No gallop rhythm.

ABDOMEN:  Soft.  Tender in the right lower abdomen.  Rebound negative.  Bowel

sounds positive.

EXTREMITIES:  No edema.

NEUROLOGIC:  Awake, alert, oriented.  No focal motor deficits.



LABORATORY DATA:  White cell ____, hemoglobin 14.6, hematocrit 42.9, and

platelets 184.  Sodium 132, potassium 3.2, BUN 16, and creatinine 0.06. 

____1.0.



ASSESSMENT:  Acute appendicitis.



PLAN:  The patient admitted to the hospital under Dr. Pacheco's service, started

on IV fluid, IV antibiotic, n.p.o.  Dr. José consulted on the case.  The patient

is a full code.





DD: 07/24/2018 21:29

DT: 07/25/2018 00:10

JOB# [id number]  [id number]

## 2018-07-25 NOTE — GENERAL PROGRESS NOTE
Subjective





- Review of Systems


Service Date: 07/25/18


Events since last encounter: 





labs ok


tolerating full liquids


perforated appendicitis





Objective





- Results


Result Diagrams: 


 07/25/18 05:30





 07/25/18 05:30


Recent Labs: 


 Laboratory Last Values











WBC  8.3 Th/cmm (4.8-10.8)   07/25/18  05:30    


 


RBC  4.02 Mil/cmm (3.80-5.10)   07/25/18  05:30    


 


Hgb  12.6 gm/dL (12-16)   07/25/18  05:30    


 


Hct  36.5 % (41.0-60)  L D 07/25/18  05:30    


 


MCV  90.8 fl ()   07/25/18  05:30    


 


MCH  31.3 pg (27.0-31.0)  H  07/25/18  05:30    


 


MCHC Differential  34.5 pg (28.0-36.0)   07/25/18  05:30    


 


RDW  13.1 % (11.5-20.0)   07/25/18  05:30    


 


Plt Count  155 Th/cmm (150-400)   07/25/18  05:30    


 


MPV  8.9 fl  07/25/18  05:30    


 


Neutrophils %  82.3 % (40.0-80.0)  H  07/25/18  05:30    


 


Lymphocytes %  12.6 % (20.0-50.0)  L  07/25/18  05:30    


 


Monocytes %  4.5 % (2.0-10.0)   07/25/18  05:30    


 


Eosinophils %  0.5 % (0.0-5.0)   07/25/18  05:30    


 


Basophils %  0.1 % (0.0-2.0)   07/25/18  05:30    


 


Neutrophils (Manual)  Not Reportable   07/24/18  11:00    


 


PT  9.6 SECONDS (9.5-11.5)   07/24/18  11:00    


 


INR  0.92  (0.5-1.4)   07/24/18  11:00    


 


Sodium  138 mEq/L (136-145)   07/25/18  05:30    


 


Potassium  3.8 mEq/L (3.5-5.1)   07/25/18  05:30    


 


Chloride  108 mEq/L ()  H  07/25/18  05:30    


 


Carbon Dioxide  21.5 mEq/L (21.0-31.0)   07/25/18  05:30    


 


Anion Gap  12.3  (7.0-16.0)   07/25/18  05:30    


 


BUN  18 mg/dL (7-25)   07/25/18  05:30    


 


Creatinine  0.9 mg/dL (0.6-1.2)   07/25/18  05:30    


 


Est GFR ( Amer)  > 60.0 ml/min (>90)   07/25/18  05:30    


 


Est GFR (Non-Af Amer)  > 60.0 ml/min  07/25/18  05:30    


 


BUN/Creatinine Ratio  20.0   07/25/18  05:30    


 


Glucose  123 mg/dL ()  H  07/25/18  05:30    


 


POC Glucose  128 MG/DL (70 - 105)  H  07/24/18  18:16    


 


Calcium  8.5 mg/dL (8.6-10.3)  L  07/25/18  05:30    


 


Total Bilirubin  0.9 mg/dL (0.3-1.0)   07/25/18  05:30    


 


AST  26 U/L (13-39)   07/25/18  05:30    


 


ALT  39 U/L (7-52)   07/25/18  05:30    


 


Alkaline Phosphatase  70 U/L ()   07/25/18  05:30    


 


Creatine Kinase  27 U/L ()  L  07/24/18  11:00    


 


Troponin I  < 0.01 ng/mL (0.01-0.05)  L  07/24/18  11:00    


 


B-Natriuretic Peptide  10.4 pg/mL (5.0-100.0)   07/24/18  11:00    


 


Total Protein  6.2 gm/dL (6.0-8.3)   07/25/18  05:30    


 


Albumin  3.4 gm/dL (3.7-5.3)  L  07/25/18  05:30    


 


Globulin  2.8 gm/dL  07/25/18  05:30    


 


Albumin/Globulin Ratio  1.2  (1.0-1.8)   07/25/18  05:30    


 


Triglycerides  144 mg/dL (<150)   07/24/18  11:00    


 


Cholesterol  256 mg/dL (<200)  H  07/24/18  11:00    


 


LDL Cholesterol Direct  173 mg/dL ()   07/24/18  11:00    


 


HDL Cholesterol  64 mg/dL (23-92)   07/24/18  11:00    


 


Amylase  13 U/L ()  L  07/24/18  11:00    


 


Lipase  11 U/L (11-82)   07/24/18  11:00    


 


Serum Pregnancy, Qual  NEGATIVE  (NEGATIVE)   07/24/18  11:00    


 


Urine Source  CLEAN C   07/24/18  13:29    


 


Urine Color  YELLOW   07/24/18  13:29    


 


Urine Clarity  HAZY  (CLEAR)   07/24/18  13:29    


 


Urine pH  6.0  (4.6 - 8.0)   07/24/18  13:29    


 


Ur Specific Gravity  1.010  (1.005-1.030)   07/24/18  13:29    


 


Urine Protein  TRACE mg/dL (NEGATIVE)   07/24/18  13:29    


 


Urine Glucose (UA)  NEGATIVE mg/dL (NEGATIVE)   07/24/18  13:29    


 


Urine Ketones  TRACE mg/dL (NEGATIVE)   07/24/18  13:29    


 


Urine Blood  TRACE  (NEGATIVE)   07/24/18  13:29    


 


Urine Nitrate  NEGATIVE  (NEGATIVE)   07/24/18  13:29    


 


Urine Bilirubin  SMALL  (NEGATIVE)  H  07/24/18  13:29    


 


Urine Urobilinogen  2.0 E.U./dL (0.2 - 1.0)   07/24/18  13:29    


 


Ur Leukocyte Esterase  TRACE  (NEGATIVE)  H  07/24/18  13:29    


 


Urine RBC  2-5 /hpf (0-5)   07/24/18  13:29    


 


Urine WBC  6-10 /hpf (0-5)  H  07/24/18  13:29    


 


Ur Epithelial Cells  MODERATE /lpf (FEW)   07/24/18  13:29    


 


Urine Bacteria  1+ /hpf (NONE SEEN)  H  07/24/18  13:29    


 


Fine Granular Casts  0-2 /lpf (NONE SEEN)  H  07/24/18  13:29    


 


Coarse Granular Casts  2-5 /lpf (NONE SEEN)  H  07/24/18  13:29    














- Physical Exam


Vitals and I&O: 


 Vital Signs











Temp  97.3 F   07/25/18 11:54


 


Pulse  91   07/25/18 11:54


 


Resp  18   07/25/18 11:54


 


BP  107/69   07/25/18 11:54


 


Pulse Ox  95   07/25/18 11:54








 Intake & Output











 07/24/18 07/25/18 07/25/18





 18:59 06:59 18:59


 


Intake Total 50 1200 100


 


Output Total  50 


 


Balance 50 1150 100


 


Weight (lbs) 76.657 kg 84.822 kg 


 


Intake:   


 


  Intake, IV Amount 50 1200 100


 


    D5-0.9NS w/KCL 20mEq 1,  1000 





    000 ml @ 125 mls/hr IV .   





    Q8H [facility] Rx#:[id number]   


 


    Piperacillin Sodium/ 50 100 





    Tazobact 3.375 gm In   





    Sodium Chloride 0.9% 50   





    ml @ 100 mls/hr IV Q6HR   





    [facility] Rx#:[id number]   


 


    metroNIDAZOLE 500mg/NS  100 100





    100mL 500 mg In 100 ml @   





    100 mls/hr IV Q8HR [facility] Rx   





    #:[id number]   


 


Output:   


 


  Drainage  50 


 


    Lower Medial Abdomen  50 


 


Other:   


 


  # Voids 1 1 


 


  Stool Characteristics Formed  


 


  Weight Source Patient stated Bedscale 











Active Medications: 


Current Medications





Fentanyl Citrate (Sublimaze)  50 mcg IVP UD PRN


   PRN Reason: Pain (Moderate)


   Stop: 07/25/18 20:45


Potassium Chloride/Dextrose/Sod Cl (D5-0.9ns W/Kcl 20meq)  1,000 mls @ 125 mls/

hr IV .Q8H [facility]


   Stop: 09/22/18 15:51


   Last Admin: 07/25/18 09:53 Dose:  125 mls/hr


Piperacillin Sod/Tazobactam (Sod 3.375 gm/ Sodium Chloride)  50 mls @ 100 mls/

hr IV Q6HR [facility]


   Stop: 09/22/18 17:59


   Last Admin: 07/25/18 11:38 Dose:  100 mls/hr


Metronidazole (Flagyl)  500 mg in 100 mls @ 100 mls/hr IV Q8HR [facility]


   Stop: 09/22/18 20:59


   Last Admin: 07/25/18 13:48 Dose:  100 mls/hr


Morphine Sulfate (Morphine)  4 mg IV Q4H PRN


   PRN Reason: Pain (Severe)


   Stop: 09/22/18 15:59


   Last Admin: 07/25/18 10:08 Dose:  4 mg

## 2018-07-25 NOTE — DIAGNOSTIC IMAGING REPORT
Chest x-ray single view 



History: Preop



Comparison: None



The heart size is normal. No focal pulmonary parenchymal processes. No

hilar or mediastinal abnormalities.



Impression: No acute abnormalities

## 2018-07-26 RX ADMIN — DEXTROSE, SODIUM CHLORIDE, AND POTASSIUM CHLORIDE SCH MLS/HR: 5; .9; .15 INJECTION INTRAVENOUS at 15:28

## 2018-07-26 RX ADMIN — METRONIDAZOLE SCH MLS/HR: 500 INJECTION, SOLUTION INTRAVENOUS at 05:04

## 2018-07-26 RX ADMIN — MORPHINE SULFATE PRN MG: 4 INJECTION, SOLUTION INTRAMUSCULAR; INTRAVENOUS at 15:26

## 2018-07-26 RX ADMIN — MORPHINE SULFATE PRN MG: 4 INJECTION, SOLUTION INTRAMUSCULAR; INTRAVENOUS at 21:34

## 2018-07-26 RX ADMIN — METRONIDAZOLE SCH MLS/HR: 500 INJECTION, SOLUTION INTRAVENOUS at 21:33

## 2018-07-26 RX ADMIN — MORPHINE SULFATE PRN MG: 4 INJECTION, SOLUTION INTRAMUSCULAR; INTRAVENOUS at 08:44

## 2018-07-26 RX ADMIN — METRONIDAZOLE SCH MLS/HR: 500 INJECTION, SOLUTION INTRAVENOUS at 13:02

## 2018-07-26 NOTE — GENERAL PROGRESS NOTE
Subjective





- Review of Systems


Service Date: 07/26/18


Events since last encounter: 





ELANA drainage 45 cc


had BM


await C and S prior to DC





Objective





- Results


Result Diagrams: 


 07/25/18 14:56





 07/25/18 05:30


Recent Labs: 


 Laboratory Last Values











WBC  8.1 Th/cmm (4.8-10.8)   07/25/18  14:56    


 


RBC  3.96 Mil/cmm (3.80-5.10)   07/25/18  14:56    


 


Hgb  12.5 gm/dL (12-16)   07/25/18  14:56    


 


Hct  36.2 % (41.0-60)  L  07/25/18  14:56    


 


MCV  91.5 fl ()   07/25/18  14:56    


 


MCH  31.6 pg (27.0-31.0)  H  07/25/18  14:56    


 


MCHC Differential  34.5 pg (28.0-36.0)   07/25/18  14:56    


 


RDW  13.3 % (11.5-20.0)   07/25/18  14:56    


 


Plt Count  168 Th/cmm (150-400)   07/25/18  14:56    


 


MPV  8.9 fl  07/25/18  14:56    


 


Neutrophils %  79.2 % (40.0-80.0)   07/25/18  14:56    


 


Lymphocytes %  11.8 % (20.0-50.0)  L  07/25/18  14:56    


 


Monocytes %  5.6 % (2.0-10.0)   07/25/18  14:56    


 


Eosinophils %  2.3 % (0.0-5.0)   07/25/18  14:56    


 


Basophils %  1.1 % (0.0-2.0)   07/25/18  14:56    


 


Neutrophils (Manual)  Not Reportable   07/24/18  11:00    


 


PT  9.6 SECONDS (9.5-11.5)   07/24/18  11:00    


 


INR  0.92  (0.5-1.4)   07/24/18  11:00    


 


Sodium  138 mEq/L (136-145)   07/25/18  05:30    


 


Potassium  3.8 mEq/L (3.5-5.1)   07/25/18  05:30    


 


Chloride  108 mEq/L ()  H  07/25/18  05:30    


 


Carbon Dioxide  21.5 mEq/L (21.0-31.0)   07/25/18  05:30    


 


Anion Gap  12.3  (7.0-16.0)   07/25/18  05:30    


 


BUN  18 mg/dL (7-25)   07/25/18  05:30    


 


Creatinine  0.9 mg/dL (0.6-1.2)   07/25/18  05:30    


 


Est GFR ( Amer)  > 60.0 ml/min (>90)   07/25/18  05:30    


 


Est GFR (Non-Af Amer)  > 60.0 ml/min  07/25/18  05:30    


 


BUN/Creatinine Ratio  20.0   07/25/18  05:30    


 


Glucose  123 mg/dL ()  H  07/25/18  05:30    


 


POC Glucose  128 MG/DL (70 - 105)  H  07/24/18  18:16    


 


Calcium  8.5 mg/dL (8.6-10.3)  L  07/25/18  05:30    


 


Total Bilirubin  0.9 mg/dL (0.3-1.0)   07/25/18  05:30    


 


AST  26 U/L (13-39)   07/25/18  05:30    


 


ALT  39 U/L (7-52)   07/25/18  05:30    


 


Alkaline Phosphatase  70 U/L ()   07/25/18  05:30    


 


Creatine Kinase  27 U/L ()  L  07/24/18  11:00    


 


Troponin I  < 0.01 ng/mL (0.01-0.05)  L  07/24/18  11:00    


 


B-Natriuretic Peptide  10.4 pg/mL (5.0-100.0)   07/24/18  11:00    


 


Total Protein  6.2 gm/dL (6.0-8.3)   07/25/18  05:30    


 


Albumin  3.4 gm/dL (3.7-5.3)  L  07/25/18  05:30    


 


Globulin  2.8 gm/dL  07/25/18  05:30    


 


Albumin/Globulin Ratio  1.2  (1.0-1.8)   07/25/18  05:30    


 


Triglycerides  144 mg/dL (<150)   07/24/18  11:00    


 


Cholesterol  256 mg/dL (<200)  H  07/24/18  11:00    


 


LDL Cholesterol Direct  173 mg/dL ()   07/24/18  11:00    


 


HDL Cholesterol  64 mg/dL (23-92)   07/24/18  11:00    


 


Amylase  13 U/L ()  L  07/24/18  11:00    


 


Lipase  11 U/L (11-82)   07/24/18  11:00    


 


Serum Pregnancy, Qual  NEGATIVE  (NEGATIVE)   07/24/18  11:00    


 


Urine Source  CLEAN C   07/24/18  13:29    


 


Urine Color  YELLOW   07/24/18  13:29    


 


Urine Clarity  HAZY  (CLEAR)   07/24/18  13:29    


 


Urine pH  6.0  (4.6 - 8.0)   07/24/18  13:29    


 


Ur Specific Gravity  1.010  (1.005-1.030)   07/24/18  13:29    


 


Urine Protein  TRACE mg/dL (NEGATIVE)   07/24/18  13:29    


 


Urine Glucose (UA)  NEGATIVE mg/dL (NEGATIVE)   07/24/18  13:29    


 


Urine Ketones  TRACE mg/dL (NEGATIVE)   07/24/18  13:29    


 


Urine Blood  TRACE  (NEGATIVE)   07/24/18  13:29    


 


Urine Nitrate  NEGATIVE  (NEGATIVE)   07/24/18  13:29    


 


Urine Bilirubin  SMALL  (NEGATIVE)  H  07/24/18  13:29    


 


Urine Urobilinogen  2.0 E.U./dL (0.2 - 1.0)   07/24/18  13:29    


 


Ur Leukocyte Esterase  TRACE  (NEGATIVE)  H  07/24/18  13:29    


 


Urine RBC  2-5 /hpf (0-5)   07/24/18  13:29    


 


Urine WBC  6-10 /hpf (0-5)  H  07/24/18  13:29    


 


Ur Epithelial Cells  MODERATE /lpf (FEW)   07/24/18  13:29    


 


Urine Bacteria  1+ /hpf (NONE SEEN)  H  07/24/18  13:29    


 


Fine Granular Casts  0-2 /lpf (NONE SEEN)  H  07/24/18  13:29    


 


Coarse Granular Casts  2-5 /lpf (NONE SEEN)  H  07/24/18  13:29    














- Physical Exam


Vitals and I&O: 


 Vital Signs











Temp  97.4 F   07/26/18 08:00


 


Pulse  81   07/26/18 08:00


 


Resp  19   07/26/18 08:00


 


BP  134/65   07/26/18 08:00


 


Pulse Ox  96   07/26/18 08:00








 Intake & Output











 07/25/18 07/26/18 07/26/18





 18:59 06:59 18:59


 


Intake Total 1300 150 


 


Output Total  45 


 


Balance 1300 105 


 


Weight (lbs)  84.822 kg 


 


Intake:   


 


  Intake, IV Amount 1300 150 


 


    D5-0.9NS w/KCL 20mEq 1, 1000  





    000 ml @ 125 mls/hr IV .   





    Q8H [facility] Rx#:[id number]   


 


    Piperacillin Sodium/ 100 50 





    Tazobact 3.375 gm In   





    Sodium Chloride 0.9% 50   





    ml @ 100 mls/hr IV Q6HR   





    [facility] Rx#:[id number]   


 


    metroNIDAZOLE 500mg/ 100 





    100mL 500 mg In 100 ml @   





    100 mls/hr IV Q8HR [facility] Rx   





    #:[id number]   


 


Output:   


 


  Drainage  45 


 


    Lower Medial Abdomen  45 


 


Other:   


 


  # Voids  3 


 


  # Bowel Movements  1 


 


  Weight Source  Estimated 











Active Medications: 


Current Medications





Potassium Chloride/Dextrose/Sod Cl (D5-0.9ns W/Kcl 20meq)  1,000 mls @ 125 mls/

hr IV .Q8H [facility]


   Stop: 09/22/18 15:51


   Last Admin: 07/25/18 23:38 Dose:  125 mls/hr


Piperacillin Sod/Tazobactam (Sod 3.375 gm/ Sodium Chloride)  50 mls @ 100 mls/

hr IV Q6HR [facility]


   Stop: 09/22/18 17:59


   Last Admin: 07/26/18 05:52 Dose:  100 mls/hr


Metronidazole (Flagyl)  500 mg in 100 mls @ 100 mls/hr IV Q8HR [facility]


   Stop: 09/22/18 20:59


   Last Admin: 07/26/18 05:04 Dose:  100 mls/hr


Morphine Sulfate (Morphine)  4 mg IV Q4H PRN


   PRN Reason: Pain (Severe)


   Stop: 09/22/18 15:59


   Last Admin: 07/26/18 08:44 Dose:  4 mg











- Procedures


Procedures: 


 Procedures











Procedure Code Date


 


INSPECTION OF LOWER INTESTINAL TRACT, PERC ENDO APPROACH [id number] 07/24/18


 


RESECTION OF APPENDIX, OPEN APPROACH [id number] 07/24/18

## 2018-07-26 NOTE — PATHOLOGY REPORT
Collection Date:  7/24/2018 



Surgeon:  Dr. CARLO José



Specimen Description:  Appendix



Gross Description:  Received in formalin is a 7.5 cm in length x 1.2 cm in

diameter tan-gray appendix with exudate coating the outer surface of the

specimen and involving a small portion of attached yellow fatty tissue.  An area

of fragmentation is seen in the middle portion of the appendix where the 
muscular

wall is disrupted, consistent with perforation of the appendix.  This area of

perforation measures approximately 0.8 cm.  Sectioning of the remainder of the

appendix shows an otherwise intact muscular wall.  There are no mass lesions.  
Representative sections are submitted in three cassettes, labeled A1 to A3.  

Cassette A1 and A2 show the middle appendix, cassette A3 shows the tip of 
appendix.



Microscopic Description:  The histologic sections show appendix with extensive

acute inflammation seen extending through the muscular wall and also involving

the mucosal surfaces.  The inflammatory cells consist of mostly neutrophils,

consistent with acute appendicitis.  There is also fragmentation and disruption

of the muscular wall, consistent with an area of perforation.  



Diagnosis:  Acute appendicitis with perforation.





DD: 07/26/2018 15:52

DT: 07/26/2018 19:02

[facility]# [id number]  [id number]

[facility]

## 2018-07-26 NOTE — INTERNAL MEDICINE PROG NOTE
Internal Medicine Subjective





- Subjective


Service Date: 07/26/18


Patient seen and examined:: with staff (SHE FEELS BETTER)


Patient is:: in bed, talking


Patient Complaints of:: other (SHE HAS PAIN AT SITE OF SURGERY)


Per staff patient has:: no adverse event





Internal Medicine Objective





- Results


Result Diagrams: 


 07/25/18 14:56





 07/25/18 05:30


Recent Labs: 


 Laboratory Last Values











WBC  8.1 Th/cmm (4.8-10.8)   07/25/18  14:56    


 


RBC  3.96 Mil/cmm (3.80-5.10)   07/25/18  14:56    


 


Hgb  12.5 gm/dL (12-16)   07/25/18  14:56    


 


Hct  36.2 % (41.0-60)  L  07/25/18  14:56    


 


MCV  91.5 fl ()   07/25/18  14:56    


 


MCH  31.6 pg (27.0-31.0)  H  07/25/18  14:56    


 


MCHC Differential  34.5 pg (28.0-36.0)   07/25/18  14:56    


 


RDW  13.3 % (11.5-20.0)   07/25/18  14:56    


 


Plt Count  168 Th/cmm (150-400)   07/25/18  14:56    


 


MPV  8.9 fl  07/25/18  14:56    


 


Neutrophils %  79.2 % (40.0-80.0)   07/25/18  14:56    


 


Lymphocytes %  11.8 % (20.0-50.0)  L  07/25/18  14:56    


 


Monocytes %  5.6 % (2.0-10.0)   07/25/18  14:56    


 


Eosinophils %  2.3 % (0.0-5.0)   07/25/18  14:56    


 


Basophils %  1.1 % (0.0-2.0)   07/25/18  14:56    


 


Neutrophils (Manual)  Not Reportable   07/24/18  11:00    


 


PT  9.6 SECONDS (9.5-11.5)   07/24/18  11:00    


 


INR  0.92  (0.5-1.4)   07/24/18  11:00    


 


Sodium  138 mEq/L (136-145)   07/25/18  05:30    


 


Potassium  3.8 mEq/L (3.5-5.1)   07/25/18  05:30    


 


Chloride  108 mEq/L ()  H  07/25/18  05:30    


 


Carbon Dioxide  21.5 mEq/L (21.0-31.0)   07/25/18  05:30    


 


Anion Gap  12.3  (7.0-16.0)   07/25/18  05:30    


 


BUN  18 mg/dL (7-25)   07/25/18  05:30    


 


Creatinine  0.9 mg/dL (0.6-1.2)   07/25/18  05:30    


 


Est GFR ( Amer)  > 60.0 ml/min (>90)   07/25/18  05:30    


 


Est GFR (Non-Af Amer)  > 60.0 ml/min  07/25/18  05:30    


 


BUN/Creatinine Ratio  20.0   07/25/18  05:30    


 


Glucose  123 mg/dL ()  H  07/25/18  05:30    


 


POC Glucose  128 MG/DL (70 - 105)  H  07/24/18  18:16    


 


Calcium  8.5 mg/dL (8.6-10.3)  L  07/25/18  05:30    


 


Total Bilirubin  0.9 mg/dL (0.3-1.0)   07/25/18  05:30    


 


AST  26 U/L (13-39)   07/25/18  05:30    


 


ALT  39 U/L (7-52)   07/25/18  05:30    


 


Alkaline Phosphatase  70 U/L ()   07/25/18  05:30    


 


Creatine Kinase  27 U/L ()  L  07/24/18  11:00    


 


Troponin I  < 0.01 ng/mL (0.01-0.05)  L  07/24/18  11:00    


 


B-Natriuretic Peptide  10.4 pg/mL (5.0-100.0)   07/24/18  11:00    


 


Total Protein  6.2 gm/dL (6.0-8.3)   07/25/18  05:30    


 


Albumin  3.4 gm/dL (3.7-5.3)  L  07/25/18  05:30    


 


Globulin  2.8 gm/dL  07/25/18  05:30    


 


Albumin/Globulin Ratio  1.2  (1.0-1.8)   07/25/18  05:30    


 


Triglycerides  144 mg/dL (<150)   07/24/18  11:00    


 


Cholesterol  256 mg/dL (<200)  H  07/24/18  11:00    


 


LDL Cholesterol Direct  173 mg/dL ()   07/24/18  11:00    


 


HDL Cholesterol  64 mg/dL (23-92)   07/24/18  11:00    


 


Amylase  13 U/L ()  L  07/24/18  11:00    


 


Lipase  11 U/L (11-82)   07/24/18  11:00    


 


Serum Pregnancy, Qual  NEGATIVE  (NEGATIVE)   07/24/18  11:00    


 


Urine Source  CLEAN C   07/24/18  13:29    


 


Urine Color  YELLOW   07/24/18  13:29    


 


Urine Clarity  HAZY  (CLEAR)   07/24/18  13:29    


 


Urine pH  6.0  (4.6 - 8.0)   07/24/18  13:29    


 


Ur Specific Gravity  1.010  (1.005-1.030)   07/24/18  13:29    


 


Urine Protein  TRACE mg/dL (NEGATIVE)   07/24/18  13:29    


 


Urine Glucose (UA)  NEGATIVE mg/dL (NEGATIVE)   07/24/18  13:29    


 


Urine Ketones  TRACE mg/dL (NEGATIVE)   07/24/18  13:29    


 


Urine Blood  TRACE  (NEGATIVE)   07/24/18  13:29    


 


Urine Nitrate  NEGATIVE  (NEGATIVE)   07/24/18  13:29    


 


Urine Bilirubin  SMALL  (NEGATIVE)  H  07/24/18  13:29    


 


Urine Urobilinogen  2.0 E.U./dL (0.2 - 1.0)   07/24/18  13:29    


 


Ur Leukocyte Esterase  TRACE  (NEGATIVE)  H  07/24/18  13:29    


 


Urine RBC  2-5 /hpf (0-5)   07/24/18  13:29    


 


Urine WBC  6-10 /hpf (0-5)  H  07/24/18  13:29    


 


Ur Epithelial Cells  MODERATE /lpf (FEW)   07/24/18  13:29    


 


Urine Bacteria  1+ /hpf (NONE SEEN)  H  07/24/18  13:29    


 


Fine Granular Casts  0-2 /lpf (NONE SEEN)  H  07/24/18  13:29    


 


Coarse Granular Casts  2-5 /lpf (NONE SEEN)  H  07/24/18  13:29    














- Physical Exam


Vitals and I&O: 


 Vital Signs











Temp  98 F   07/26/18 16:00


 


Pulse  89   07/26/18 16:00


 


Resp  20   07/26/18 16:00


 


BP  136/82   07/26/18 16:00


 


Pulse Ox  97   07/26/18 16:00








 Intake & Output











 07/26/18 07/26/18 07/27/18





 06:59 18:59 06:59


 


Intake Total 300 1150 497.917


 


Output Total 45  25


 


Balance 255 1150 472.917


 


Weight (lbs) 84.822 kg  84.822 kg


 


Intake:   


 


  Intake, IV Amount 300 1150 497.917


 


    D5-0.9NS w/KCL 20mEq 1,  1000 447.917





    000 ml @ 125 mls/hr IV .   





    Q8H [facility] Rx#:[id number]   


 


    Piperacillin Sodium/ 100 50 50





    Tazobact 3.375 gm In   





    Sodium Chloride 0.9% 50   





    ml @ 100 mls/hr IV Q6HR   





    [facility] Rx#:[id number]   


 


    metroNIDAZOLE 500mg/ 100 





    100mL 500 mg In 100 ml @   





    100 mls/hr IV Q8HR [facility] Rx   





    #:[id number]   


 


Output:   


 


  Drainage 45  25


 


    Lower Medial Abdomen 45  25


 


Other:   


 


  # Voids 3  5


 


  # Bowel Movements 1  1


 


  Weight Source Estimated  Estimated











Active Medications: 


Current Medications





Potassium Chloride/Dextrose/Sod Cl (D5-0.9ns W/Kcl 20meq)  1,000 mls @ 125 mls/

hr IV .Q8H [facility]


   Stop: 09/22/18 15:51


   Last Infusion: 07/26/18 19:03 Dose:  125 mls/hr


Piperacillin Sod/Tazobactam (Sod 3.375 gm/ Sodium Chloride)  50 mls @ 100 mls/

hr IV Q6HR [facility]


   Stop: 09/22/18 17:59


   Last Infusion: 07/26/18 19:03 Dose:  Infused


Metronidazole (Flagyl)  500 mg in 100 mls @ 100 mls/hr IV Q8HR [facility]


   Stop: 09/22/18 20:59


   Last Admin: 07/26/18 21:33 Dose:  100 mls/hr


Morphine Sulfate (Morphine)  4 mg IV Q4H PRN


   PRN Reason: Pain (Severe)


   Stop: 09/22/18 15:59


   Last Admin: 07/26/18 21:34 Dose:  4 mg








General: alert


HEENT: NC/AT, PERRLA, EOMI, anicteric sclerae, throat clear


Neck: Supple, No JVD, No thyromegaly, +2 carotid pulse wo bruit, No LAD


Lungs: CTAB


Cardiovascular: RRR, Normal S1, Normal S2


Abdomen: tender


Neurological: no change





- Procedures


Procedures: 


 Procedures











Procedure Code Date


 


INSPECTION OF LOWER INTESTINAL TRACT, PERC ENDO APPROACH [id number] 07/24/18


 


RESECTION OF APPENDIX, OPEN APPROACH [id number] 07/24/18














Internal Medicine Assmt/Plan





- Assessment


Assessment: 





ACUTE APPENDICITIS AND SP LAP.APPENDECTOMY.





- Plan


Plan: 





CONTINUE ON CURRENT MEDICATION AND DIET.

## 2018-07-27 RX ADMIN — DEXTROSE, SODIUM CHLORIDE, AND POTASSIUM CHLORIDE SCH MLS/HR: 5; .9; .15 INJECTION INTRAVENOUS at 12:47

## 2018-07-27 RX ADMIN — METRONIDAZOLE SCH MLS/HR: 500 INJECTION, SOLUTION INTRAVENOUS at 12:47

## 2018-07-27 RX ADMIN — DEXTROSE, SODIUM CHLORIDE, AND POTASSIUM CHLORIDE SCH MLS/HR: 5; .9; .15 INJECTION INTRAVENOUS at 20:23

## 2018-07-27 RX ADMIN — DEXTROSE, SODIUM CHLORIDE, AND POTASSIUM CHLORIDE SCH MLS/HR: 5; .9; .15 INJECTION INTRAVENOUS at 02:59

## 2018-07-27 RX ADMIN — MORPHINE SULFATE PRN MG: 4 INJECTION, SOLUTION INTRAMUSCULAR; INTRAVENOUS at 05:14

## 2018-07-27 RX ADMIN — METRONIDAZOLE SCH MLS/HR: 500 INJECTION, SOLUTION INTRAVENOUS at 20:22

## 2018-07-27 RX ADMIN — METRONIDAZOLE SCH MLS/HR: 500 INJECTION, SOLUTION INTRAVENOUS at 05:08

## 2018-07-27 RX ADMIN — MORPHINE SULFATE PRN MG: 4 INJECTION, SOLUTION INTRAMUSCULAR; INTRAVENOUS at 18:29

## 2018-07-27 NOTE — GENERAL PROGRESS NOTE
Subjective





- Review of Systems


Service Date: 07/27/18


Events since last encounter: 





no ELANA drainage overnight, removed


may DC with oral antibiotics and pain pills


sponge bath only


to my office 1 week





Objective





- Results


Result Diagrams: 


 07/25/18 14:56





 07/25/18 05:30


Recent Labs: 


 Laboratory Last Values











WBC  8.1 Th/cmm (4.8-10.8)   07/25/18  14:56    


 


RBC  3.96 Mil/cmm (3.80-5.10)   07/25/18  14:56    


 


Hgb  12.5 gm/dL (12-16)   07/25/18  14:56    


 


Hct  36.2 % (41.0-60)  L  07/25/18  14:56    


 


MCV  91.5 fl ()   07/25/18  14:56    


 


MCH  31.6 pg (27.0-31.0)  H  07/25/18  14:56    


 


MCHC Differential  34.5 pg (28.0-36.0)   07/25/18  14:56    


 


RDW  13.3 % (11.5-20.0)   07/25/18  14:56    


 


Plt Count  168 Th/cmm (150-400)   07/25/18  14:56    


 


MPV  8.9 fl  07/25/18  14:56    


 


Neutrophils %  79.2 % (40.0-80.0)   07/25/18  14:56    


 


Lymphocytes %  11.8 % (20.0-50.0)  L  07/25/18  14:56    


 


Monocytes %  5.6 % (2.0-10.0)   07/25/18  14:56    


 


Eosinophils %  2.3 % (0.0-5.0)   07/25/18  14:56    


 


Basophils %  1.1 % (0.0-2.0)   07/25/18  14:56    


 


Neutrophils (Manual)  Not Reportable   07/24/18  11:00    


 


PT  9.6 SECONDS (9.5-11.5)   07/24/18  11:00    


 


INR  0.92  (0.5-1.4)   07/24/18  11:00    


 


Sodium  138 mEq/L (136-145)   07/25/18  05:30    


 


Potassium  3.8 mEq/L (3.5-5.1)   07/25/18  05:30    


 


Chloride  108 mEq/L ()  H  07/25/18  05:30    


 


Carbon Dioxide  21.5 mEq/L (21.0-31.0)   07/25/18  05:30    


 


Anion Gap  12.3  (7.0-16.0)   07/25/18  05:30    


 


BUN  18 mg/dL (7-25)   07/25/18  05:30    


 


Creatinine  0.9 mg/dL (0.6-1.2)   07/25/18  05:30    


 


Est GFR ( Amer)  > 60.0 ml/min (>90)   07/25/18  05:30    


 


Est GFR (Non-Af Amer)  > 60.0 ml/min  07/25/18  05:30    


 


BUN/Creatinine Ratio  20.0   07/25/18  05:30    


 


Glucose  123 mg/dL ()  H  07/25/18  05:30    


 


POC Glucose  128 MG/DL (70 - 105)  H  07/24/18  18:16    


 


Calcium  8.5 mg/dL (8.6-10.3)  L  07/25/18  05:30    


 


Total Bilirubin  0.9 mg/dL (0.3-1.0)   07/25/18  05:30    


 


AST  26 U/L (13-39)   07/25/18  05:30    


 


ALT  39 U/L (7-52)   07/25/18  05:30    


 


Alkaline Phosphatase  70 U/L ()   07/25/18  05:30    


 


Creatine Kinase  27 U/L ()  L  07/24/18  11:00    


 


Troponin I  < 0.01 ng/mL (0.01-0.05)  L  07/24/18  11:00    


 


B-Natriuretic Peptide  10.4 pg/mL (5.0-100.0)   07/24/18  11:00    


 


Total Protein  6.2 gm/dL (6.0-8.3)   07/25/18  05:30    


 


Albumin  3.4 gm/dL (3.7-5.3)  L  07/25/18  05:30    


 


Globulin  2.8 gm/dL  07/25/18  05:30    


 


Albumin/Globulin Ratio  1.2  (1.0-1.8)   07/25/18  05:30    


 


Triglycerides  144 mg/dL (<150)   07/24/18  11:00    


 


Cholesterol  256 mg/dL (<200)  H  07/24/18  11:00    


 


LDL Cholesterol Direct  173 mg/dL ()   07/24/18  11:00    


 


HDL Cholesterol  64 mg/dL (23-92)   07/24/18  11:00    


 


Amylase  13 U/L ()  L  07/24/18  11:00    


 


Lipase  11 U/L (11-82)   07/24/18  11:00    


 


Serum Pregnancy, Qual  NEGATIVE  (NEGATIVE)   07/24/18  11:00    


 


Urine Source  CLEAN C   07/24/18  13:29    


 


Urine Color  YELLOW   07/24/18  13:29    


 


Urine Clarity  HAZY  (CLEAR)   07/24/18  13:29    


 


Urine pH  6.0  (4.6 - 8.0)   07/24/18  13:29    


 


Ur Specific Gravity  1.010  (1.005-1.030)   07/24/18  13:29    


 


Urine Protein  TRACE mg/dL (NEGATIVE)   07/24/18  13:29    


 


Urine Glucose (UA)  NEGATIVE mg/dL (NEGATIVE)   07/24/18  13:29    


 


Urine Ketones  TRACE mg/dL (NEGATIVE)   07/24/18  13:29    


 


Urine Blood  TRACE  (NEGATIVE)   07/24/18  13:29    


 


Urine Nitrate  NEGATIVE  (NEGATIVE)   07/24/18  13:29    


 


Urine Bilirubin  SMALL  (NEGATIVE)  H  07/24/18  13:29    


 


Urine Urobilinogen  2.0 E.U./dL (0.2 - 1.0)   07/24/18  13:29    


 


Ur Leukocyte Esterase  TRACE  (NEGATIVE)  H  07/24/18  13:29    


 


Urine RBC  2-5 /hpf (0-5)   07/24/18  13:29    


 


Urine WBC  6-10 /hpf (0-5)  H  07/24/18  13:29    


 


Ur Epithelial Cells  MODERATE /lpf (FEW)   07/24/18  13:29    


 


Urine Bacteria  1+ /hpf (NONE SEEN)  H  07/24/18  13:29    


 


Fine Granular Casts  0-2 /lpf (NONE SEEN)  H  07/24/18  13:29    


 


Coarse Granular Casts  2-5 /lpf (NONE SEEN)  H  07/24/18  13:29    














- Physical Exam


Vitals and I&O: 


 Vital Signs











Temp  97.6 F   07/27/18 08:00


 


Pulse  80   07/27/18 08:00


 


Resp  17   07/27/18 08:00


 


BP  147/76   07/27/18 08:00


 


Pulse Ox  97   07/27/18 08:00








 Intake & Output











 07/26/18 07/27/18 07/27/18





 18:59 06:59 18:59


 


Intake Total 1150 1700.000 


 


Output Total  35 


 


Balance 1150 1665.000 


 


Weight (lbs)  84.822 kg 


 


Intake:   


 


  Intake, IV Amount 1150 1350.000 


 


    D5-0.9NS w/KCL 20mEq 1, 1000 1000.000 





    000 ml @ 125 mls/hr IV .   





    Q8H [facility] Rx#:[id number]   


 


    Piperacillin Sodium/ 50 150 





    Tazobact 3.375 gm In   





    Sodium Chloride 0.9% 50   





    ml @ 100 mls/hr IV Q6HR   





    [facility] Rx#:[id number]   


 


    metroNIDAZOLE 500mg/ 200 





    100mL 500 mg In 100 ml @   





    100 mls/hr IV Q8HR [facility] Rx   





    #:[id number]   


 


  Oral  350 


 


Output:   


 


  Drainage  35 


 


    Lower Medial Abdomen  35 


 


Other:   


 


  # Voids  3 


 


  # Bowel Movements  1 


 


  Weight Source  Bedscale 











Active Medications: 


Current Medications





Potassium Chloride/Dextrose/Sod Cl (D5-0.9ns W/Kcl 20meq)  1,000 mls @ 125 mls/

hr IV .Q8H [facility]


   Stop: 09/22/18 15:51


   Last Admin: 07/27/18 02:59 Dose:  125 mls/hr


Piperacillin Sod/Tazobactam (Sod 3.375 gm/ Sodium Chloride)  50 mls @ 100 mls/

hr IV Q6HR [facility]


   Stop: 09/22/18 17:59


   Last Admin: 07/27/18 11:33 Dose:  100 mls/hr


Metronidazole (Flagyl)  500 mg in 100 mls @ 100 mls/hr IV Q8HR [facility]


   Stop: 09/22/18 20:59


   Last Infusion: 07/27/18 06:08 Dose:  Infused


Morphine Sulfate (Morphine)  4 mg IV Q4H PRN


   PRN Reason: Pain (Severe)


   Stop: 09/22/18 15:59


   Last Admin: 07/27/18 05:14 Dose:  4 mg











- Procedures


Procedures: 


 Procedures











Procedure Code Date


 


INSPECTION OF LOWER INTESTINAL TRACT, PERC ENDO APPROACH [id number] 07/24/18


 


RESECTION OF APPENDIX, OPEN APPROACH [id number] 07/24/18

## 2018-07-27 NOTE — INTERNAL MEDICINE PROG NOTE
Internal Medicine Subjective





- Subjective


Service Date: 07/27/18


Patient seen and examined:: with staff (SHE FEELS WELL,LESS PAIN)


Patient is:: in bed, talking


Patient Complaints of:: other (SHE HAS PAIN AT SITE OF SURGERY)


Per staff patient has:: no adverse event





Internal Medicine Objective





- Results


Result Diagrams: 


 07/25/18 14:56





 07/25/18 05:30


Recent Labs: 


 Laboratory Last Values











WBC  8.1 Th/cmm (4.8-10.8)   07/25/18  14:56    


 


RBC  3.96 Mil/cmm (3.80-5.10)   07/25/18  14:56    


 


Hgb  12.5 gm/dL (12-16)   07/25/18  14:56    


 


Hct  36.2 % (41.0-60)  L  07/25/18  14:56    


 


MCV  91.5 fl ()   07/25/18  14:56    


 


MCH  31.6 pg (27.0-31.0)  H  07/25/18  14:56    


 


MCHC Differential  34.5 pg (28.0-36.0)   07/25/18  14:56    


 


RDW  13.3 % (11.5-20.0)   07/25/18  14:56    


 


Plt Count  168 Th/cmm (150-400)   07/25/18  14:56    


 


MPV  8.9 fl  07/25/18  14:56    


 


Neutrophils %  79.2 % (40.0-80.0)   07/25/18  14:56    


 


Lymphocytes %  11.8 % (20.0-50.0)  L  07/25/18  14:56    


 


Monocytes %  5.6 % (2.0-10.0)   07/25/18  14:56    


 


Eosinophils %  2.3 % (0.0-5.0)   07/25/18  14:56    


 


Basophils %  1.1 % (0.0-2.0)   07/25/18  14:56    


 


Neutrophils (Manual)  Not Reportable   07/24/18  11:00    


 


PT  9.6 SECONDS (9.5-11.5)   07/24/18  11:00    


 


INR  0.92  (0.5-1.4)   07/24/18  11:00    


 


Sodium  138 mEq/L (136-145)   07/25/18  05:30    


 


Potassium  3.8 mEq/L (3.5-5.1)   07/25/18  05:30    


 


Chloride  108 mEq/L ()  H  07/25/18  05:30    


 


Carbon Dioxide  21.5 mEq/L (21.0-31.0)   07/25/18  05:30    


 


Anion Gap  12.3  (7.0-16.0)   07/25/18  05:30    


 


BUN  18 mg/dL (7-25)   07/25/18  05:30    


 


Creatinine  0.9 mg/dL (0.6-1.2)   07/25/18  05:30    


 


Est GFR ( Amer)  > 60.0 ml/min (>90)   07/25/18  05:30    


 


Est GFR (Non-Af Amer)  > 60.0 ml/min  07/25/18  05:30    


 


BUN/Creatinine Ratio  20.0   07/25/18  05:30    


 


Glucose  123 mg/dL ()  H  07/25/18  05:30    


 


POC Glucose  128 MG/DL (70 - 105)  H  07/24/18  18:16    


 


Calcium  8.5 mg/dL (8.6-10.3)  L  07/25/18  05:30    


 


Total Bilirubin  0.9 mg/dL (0.3-1.0)   07/25/18  05:30    


 


AST  26 U/L (13-39)   07/25/18  05:30    


 


ALT  39 U/L (7-52)   07/25/18  05:30    


 


Alkaline Phosphatase  70 U/L ()   07/25/18  05:30    


 


Creatine Kinase  27 U/L ()  L  07/24/18  11:00    


 


Troponin I  < 0.01 ng/mL (0.01-0.05)  L  07/24/18  11:00    


 


B-Natriuretic Peptide  10.4 pg/mL (5.0-100.0)   07/24/18  11:00    


 


Total Protein  6.2 gm/dL (6.0-8.3)   07/25/18  05:30    


 


Albumin  3.4 gm/dL (3.7-5.3)  L  07/25/18  05:30    


 


Globulin  2.8 gm/dL  07/25/18  05:30    


 


Albumin/Globulin Ratio  1.2  (1.0-1.8)   07/25/18  05:30    


 


Triglycerides  144 mg/dL (<150)   07/24/18  11:00    


 


Cholesterol  256 mg/dL (<200)  H  07/24/18  11:00    


 


LDL Cholesterol Direct  173 mg/dL ()   07/24/18  11:00    


 


HDL Cholesterol  64 mg/dL (23-92)   07/24/18  11:00    


 


Amylase  13 U/L ()  L  07/24/18  11:00    


 


Lipase  11 U/L (11-82)   07/24/18  11:00    


 


Serum Pregnancy, Qual  NEGATIVE  (NEGATIVE)   07/24/18  11:00    


 


Urine Source  CLEAN C   07/24/18  13:29    


 


Urine Color  YELLOW   07/24/18  13:29    


 


Urine Clarity  HAZY  (CLEAR)   07/24/18  13:29    


 


Urine pH  6.0  (4.6 - 8.0)   07/24/18  13:29    


 


Ur Specific Gravity  1.010  (1.005-1.030)   07/24/18  13:29    


 


Urine Protein  TRACE mg/dL (NEGATIVE)   07/24/18  13:29    


 


Urine Glucose (UA)  NEGATIVE mg/dL (NEGATIVE)   07/24/18  13:29    


 


Urine Ketones  TRACE mg/dL (NEGATIVE)   07/24/18  13:29    


 


Urine Blood  TRACE  (NEGATIVE)   07/24/18  13:29    


 


Urine Nitrate  NEGATIVE  (NEGATIVE)   07/24/18  13:29    


 


Urine Bilirubin  SMALL  (NEGATIVE)  H  07/24/18  13:29    


 


Urine Urobilinogen  2.0 E.U./dL (0.2 - 1.0)   07/24/18  13:29    


 


Ur Leukocyte Esterase  TRACE  (NEGATIVE)  H  07/24/18  13:29    


 


Urine RBC  2-5 /hpf (0-5)   07/24/18  13:29    


 


Urine WBC  6-10 /hpf (0-5)  H  07/24/18  13:29    


 


Ur Epithelial Cells  MODERATE /lpf (FEW)   07/24/18  13:29    


 


Urine Bacteria  1+ /hpf (NONE SEEN)  H  07/24/18  13:29    


 


Fine Granular Casts  0-2 /lpf (NONE SEEN)  H  07/24/18  13:29    


 


Coarse Granular Casts  2-5 /lpf (NONE SEEN)  H  07/24/18  13:29    














- Physical Exam


Vitals and I&O: 


 Vital Signs











Temp  98.0 F   07/27/18 22:39


 


Pulse  72   07/27/18 22:39


 


Resp  79   07/27/18 22:39


 


BP  133/74   07/27/18 22:39


 


Pulse Ox  95   07/27/18 22:39








 Intake & Output











 07/27/18 07/27/18 07/28/18





 06:59 18:59 06:59


 


Intake Total 5761.153 3914 1050


 


Output Total 35 10 


 


Balance 5021.624 9115 1050


 


Weight (lbs) 84.822 kg 84.822 kg 


 


Intake:   


 


  Intake, IV Amount 1828.484 6405 1050


 


    D5-0.9NS w/KCL 20mEq 1, 7254.415 7657 950





    000 ml @ 125 mls/hr IV .   





    [facility][id number]   


 


    Piperacillin Sodium/ 150 50 





    Tazobact 3.375 gm In   





    Sodium Chloride 0.9% 50   





    ml @ 100 mls/hr IV Q6HR   





    [facility] Rx#:[id number]   


 


    metroNIDAZOLE 500mg/ 100 100





    100mL 500 mg In 100 ml @   





    100 mls/hr IV [facility] Rx   





    #:[id number]   


 


  Oral 350 650 


 


Output:   


 


  Drainage 35 10 


 


    Lower Medial Abdomen 35 10 


 


Other:   


 


  # Voids 3 3 


 


  # Bowel Movements 1 1 


 


  Weight Source Bedscale Bedscale 











Active Medications: 


Current Medications





Potassium Chloride/Dextrose/Sod Cl (D5-0.9ns W/Kcl 20meq)  1,000 mls @ 125 mls/

hr IV .[facility]


   Stop: 09/22/18 15:51


   Last Admin: 07/27/18 20:23 Dose:  125 mls/hr


Piperacillin Sod/Tazobactam (Sod 3.375 gm/ Sodium Chloride)  50 mls @ 100 mls/

hr IV [facility]


   Stop: 09/22/18 17:59


   Last Admin: 07/27/18 17:04 Dose:  100 mls/hr


Metronidazole (Flagyl)  500 mg in 100 mls @ 100 mls/hr IV [facility]


   Stop: 09/22/18 20:59


   Last Infusion: 07/27/18 21:22 Dose:  Infused








General: alert


HEENT: NC/AT, PERRLA, EOMI, anicteric sclerae, throat clear


Neck: Supple, No JVD, No thyromegaly, +2 carotid pulse wo bruit, No LAD


Lungs: CTAB


Cardiovascular: RRR, Normal S1, Normal S2


Abdomen: tender


Neurological: no change





- Procedures


Procedures: 


 Procedures











Procedure Code Date


 


INSPECTION OF LOWER INTESTINAL TRACT, PERC ENDO APPROACH [id number] 07/24/18


 


RESECTION OF APPENDIX, OPEN APPROACH [id number] 07/24/18














Internal Medicine Assmt/Plan





- Assessment


Assessment: 





ACUTE APPENDICITIS AND SP LAP.APPENDECTOMY.





- Plan


Plan: 





CONTINUE ON CURRENT MEDICATION AND DIET.

## 2018-07-28 RX ADMIN — DEXTROSE, SODIUM CHLORIDE, AND POTASSIUM CHLORIDE SCH MLS/HR: 5; .9; .15 INJECTION INTRAVENOUS at 00:48

## 2018-07-28 RX ADMIN — METRONIDAZOLE SCH MLS/HR: 500 INJECTION, SOLUTION INTRAVENOUS at 05:13

## 2018-08-30 NOTE — DISCHARGE SUMMARY
DATE OF DISCHARGE:  07/28/2018



FINAL DIAGNOSES:

1.  Acute appendicitis.

2.  Status post laparoscopic appendectomy.



HISTORY OF PRESENT ILLNESS:  The patient is a 47-year-old female presented to

the Emergency Room with acute abdomen.  Initial workup significant for acute

appendicitis.  Admitted to the hospital.  Dr. José consulted on the case.



LABORATORY DATA:  White blood cell of 16, hemoglobin 14.6, and hematocrit 42.9. 

Sodium 132 and potassium 3.2.  Started IV fluid, antibiotic.



COURSE OF HOSPITALIZATION:  During hospitalization, the patient underwent

laparoscopic appendectomy by Dr. José.  The patient tolerated the surgery well. 

On 07/27/2018, the patient was feeling better, less pain, no nausea, no

vomiting.



DISPOSITION:  On 07/28/2018, the patient was cleared by Surgery to go home.  The

patient discharged home.



FOLLOWUP:  Follow up with Dr. José as outpatient.



CONDITION ON DISCHARGE:  Stable.



MEDICATIONS:  Follow discharge reconciliation.





DD: 08/30/2018 17:10

DT: 08/30/2018 17:45

[facility]# [id number]  [id number]